# Patient Record
Sex: FEMALE | Race: BLACK OR AFRICAN AMERICAN | Employment: UNEMPLOYED | URBAN - METROPOLITAN AREA
[De-identification: names, ages, dates, MRNs, and addresses within clinical notes are randomized per-mention and may not be internally consistent; named-entity substitution may affect disease eponyms.]

---

## 2019-09-25 LAB
CHLAMYDIA, EXTERNAL: NORMAL
GTT, 1 HR, GLUCOLA, EXTERNAL: 99
HBSAG, EXTERNAL: NORMAL
HIV, EXTERNAL: NORMAL
RPR, EXTERNAL: NORMAL
RUBELLA, EXTERNAL: NORMAL
TYPE, ABO & RH, EXTERNAL: NORMAL

## 2019-09-27 PROBLEM — O99.212 OBESITY AFFECTING PREGNANCY IN SECOND TRIMESTER: Status: ACTIVE | Noted: 2019-09-27

## 2019-10-15 PROBLEM — O09.92 HIGH-RISK PREGNANCY IN SECOND TRIMESTER: Status: ACTIVE | Noted: 2019-10-15

## 2019-10-21 PROBLEM — O23.42 URINARY TRACT INFECTION IN MOTHER DURING SECOND TRIMESTER OF PREGNANCY: Status: ACTIVE | Noted: 2019-10-21

## 2019-12-09 PROBLEM — O99.013 ANEMIA IN PREGNANCY, THIRD TRIMESTER: Status: ACTIVE | Noted: 2019-12-09

## 2020-02-09 PROBLEM — O99.820 GBS (GROUP B STREPTOCOCCUS CARRIER), +RV CULTURE, CURRENTLY PREGNANT: Status: ACTIVE | Noted: 2020-02-09

## 2020-02-20 ENCOUNTER — HOSPITAL ENCOUNTER (OUTPATIENT)
Age: 25
Discharge: HOME | End: 2020-02-20
Attending: OBSTETRICS & GYNECOLOGY | Admitting: OBSTETRICS & GYNECOLOGY
Payer: MEDICAID

## 2020-02-20 VITALS
HEART RATE: 111 BPM | DIASTOLIC BLOOD PRESSURE: 57 MMHG | SYSTOLIC BLOOD PRESSURE: 106 MMHG | TEMPERATURE: 98.1 F | RESPIRATION RATE: 18 BRPM

## 2020-02-20 PROCEDURE — 99281 EMR DPT VST MAYX REQ PHY/QHP: CPT

## 2020-02-20 PROCEDURE — 59025 FETAL NON-STRESS TEST: CPT

## 2020-02-20 NOTE — DISCHARGE INSTRUCTIONS
Patient Education   Patient Education   Patient Education        Week 39 of Your Pregnancy: Care Instructions  Your Care Instructions    During these final weeks, you may feel anxious to see your new baby.  babies often look different from what you see in pictures or movies. Right after birth, their heads may have a strange shape. Their eyes may be puffy. And their genitals may be swollen. They may also have very dry skin, or red marks on the eyelids, nose, or neck. Still, most parents think their babies are beautiful. Follow-up care is a key part of your treatment and safety. Be sure to make and go to all appointments, and call your doctor if you are having problems. It's also a good idea to know your test results and keep a list of the medicines you take. How can you care for yourself at home? Prepare to breastfeed  · If you are breastfeeding, continue to eat healthy foods. · If your health care provider recommends it, keep taking your prenatal vitamins. · Talk to your doctor before you take any medicine or supplement. That's because some medicines can affect your breast milk. · You can help prevent sore nipples if you feed your baby in the correct position. Nurses will help you learn to do this. · Your  will need to be fed about every 1 to 3 hours. Choose the right birth control after your baby is born  · Women who are breastfeeding can still get pregnant. Use birth control if you don't want to get pregnant. · Intrauterine devices (IUDs) are very effective at preventing pregnancy and can provide birth control for 3 to 10 years, depending on the type. If you talk with your doctor before having your baby, the IUD can be placed right after you give birth. If you decide you want to get pregnant later, you can have it removed. They are safe to use while you are breastfeeding. · A hormonal implant is also very effective at preventing pregnancy. It is placed under the skin of the arm.  This can be done right after you give birth. It releases the hormone progestin and prevents pregnancy for about 3 years. This can also be removed by a doctor at any time. It is safe to use while you are breastfeeding. · Depo-Provera can be used while you are breastfeeding. It is a shot you get every 3 months. · Birth control pills work well. But you need a different kind of pill for the first few weeks after giving birth. And when you start taking these pills, you need to make sure to use another type of birth control for 7 days after you start your pack. · Diaphragms, cervical caps, and condoms with spermicide work less well after birth. If you have a diaphragm or cervical cap, talk to your doctor to see if you need a different size. · Tubal ligation (tying your tubes) and vasectomy are both permanent. These are good options if you are sure you are done having children. Where can you learn more? Go to http://ramila-sharmaine.info/. Enter G434 in the search box to learn more about \"Week 39 of Your Pregnancy: Care Instructions. \"  Current as of: May 29, 2019  Content Version: 12.2  © 8212-9446 [employer]. Care instructions adapted under license by [employer] (which disclaims liability or warranty for this information). If you have questions about a medical condition or this instruction, always ask your healthcare professional. [address] any warranty or liability for your use of this information. Pregnancy Precautions: Care Instructions  Your Care Instructions    There is no sure way to prevent labor before your due date ( labor) or to prevent most other pregnancy problems. But there are things you can do to increase your chances of a healthy pregnancy. Go to your appointments, follow your doctor's advice, and take good care of yourself. Eat well, and exercise (if your doctor agrees). And make sure to drink plenty of water.   Follow-up care is a key part of your treatment and safety. Be sure to make and go to all appointments, and call your doctor if you are having problems. It's also a good idea to know your test results and keep a list of the medicines you take. How can you care for yourself at home? · Make sure you go to your prenatal appointments. At each visit, your doctor will check your blood pressure. Your doctor will also check to see if you have protein in your urine. High blood pressure and protein in urine are signs of preeclampsia. This condition can be dangerous for you and your baby. · Drink plenty of fluids, enough so that your urine is light yellow or clear like water. Dehydration can cause contractions. If you have kidney, heart, or liver disease and have to limit fluids, talk with your doctor before you increase the amount of fluids you drink. · Tell your doctor right away if you notice any symptoms of an infection, such as:  ? Burning when you urinate. ? A foul-smelling discharge from your vagina. ? Vaginal itching. ? Unexplained fever. ? Unusual pain or soreness in your uterus or lower belly. · Eat a balanced diet. Include plenty of foods that are high in calcium and iron. ? Foods high in calcium include milk, cheese, yogurt, almonds, and broccoli. ? Foods high in iron include red meat, shellfish, poultry, eggs, beans, raisins, whole-grain bread, and leafy green vegetables. · Do not smoke. If you need help quitting, talk to your doctor about stop-smoking programs and medicines. These can increase your chances of quitting for good. · Do not drink alcohol or use illegal drugs. · Follow your doctor's directions about activity. Your doctor will let you know how much, if any, exercise you can do. · Ask your doctor if you can have sex. If you are at risk for early labor, your doctor may ask you to not have sex. · Take care to prevent falls. During pregnancy, your joints are loose, and your balance is off.  Sports such as bicycling, skiing, or in-line skating can increase your risk of falling. And don't ride horses or motorcycles, dive, water ski, scuba dive, or parachute jump while you are pregnant. · Avoid getting very hot. Do not use saunas or hot tubs. Avoid staying out in the sun in hot weather for long periods. Take acetaminophen (Tylenol) to lower a high fever. · Do not take any over-the-counter or herbal medicines or supplements without talking to your doctor or pharmacist first.  When should you call for help? Call 911 anytime you think you may need emergency care. For example, call if:    · You passed out (lost consciousness).     · You have a seizure.     · You have severe vaginal bleeding.     · You have severe pain in your belly or pelvis.     · You have had fluid gushing or leaking from your vagina and you know or think the umbilical cord is bulging into your vagina. If this happens, immediately get down on your knees so your rear end (buttocks) is higher than your head. This will decrease the pressure on the cord until help arrives.   [facility] your doctor now or seek immediate medical care if:    · You have signs of preeclampsia, such as:  ? Sudden swelling of your face, hands, or feet. ? New vision problems (such as dimness, blurring, or seeing spots). ? A severe headache.     · You have any vaginal bleeding.     · You have belly pain or cramping.     · You have a fever.     · You have had regular contractions (with or without pain) for an hour. This means that you have 8 or more within 1 hour or 4 or more in 20 minutes after you change your position and drink fluids.     · You have a sudden release of fluid from your vagina.     · You have low back pain or pelvic pressure that does not go away.     · You notice that your baby has stopped moving or is moving much less than normal.    Watch closely for changes in your health, and be sure to contact your doctor if you have any problems. Where can you learn more?   Go to http://ramila-sharmaine.info/. Enter [id number] in the search box to learn more about \"Pregnancy Precautions: Care Instructions. \"  Current as of: May 29, 2019  Content Version: 12.2  © 5161-9033 [employer]. Care instructions adapted under license by [employer] (which disclaims liability or warranty for this information). If you have questions about a medical condition or this instruction, always ask your healthcare professional. [address] any warranty or liability for your use of this information. Learning About When to Call Your Doctor During Pregnancy (After 20 Weeks)  Your Care Instructions  It's common to have concerns about what might be a problem during pregnancy. Although most pregnant women don't have any serious problems, it's important to know when to call your doctor if you have certain symptoms or signs of labor. These are general suggestions. Your doctor may give you some more information about when to call. When to call your doctor (after 20 weeks)  Call 911 anytime you think you may need emergency care. For example, call if:  · You have severe vaginal bleeding. · You have sudden, severe pain in your belly. · You passed out (lost consciousness). · You have a seizure. · You see or feel the umbilical cord. · You think you are about to deliver your baby and can't make it safely to the hospital.  Call your doctor now or seek immediate medical care if:  · You have vaginal bleeding. · You have belly pain. · You have a fever. · You have symptoms of preeclampsia, such as:  ? Sudden swelling of your face, hands, or feet. ? New vision problems (such as dimness, blurring, or seeing spots). ? A severe headache. · You have a sudden release of fluid from your vagina. (You think your water broke.)  · You think that you may be in labor. This means that you've had at least 6 contractions in an hour.   · You notice that your baby has stopped moving or is moving much less than normal.  · You have symptoms of a urinary tract infection. These may include:  ? Pain or burning when you urinate. ? A frequent need to urinate without being able to pass much urine. ? Pain in the flank, which is just below the rib cage and above the waist on either side of the back. ? Blood in your urine. Watch closely for changes in your health, and be sure to contact your doctor if:  · You have vaginal discharge that smells bad. · You have skin changes, such as:  ? A rash. ? Itching. ? Yellow color to your skin. · You have other concerns about your pregnancy. If you have labor signs at 37 weeks or more  If you have signs of labor at 37 weeks or more, your doctor may tell you to call when your labor becomes more active. Symptoms of active labor include:  · Contractions that are regular. · Contractions that are less than 5 minutes apart. · Contractions that are hard to talk through. Follow-up care is a key part of your treatment and safety. Be sure to make and go to all appointments, and call your doctor if you are having problems. It's also a good idea to know your test results and keep a list of the medicines you take. Where can you learn more? Go to http://ramila-sharmaine.info/. Enter  in the search box to learn more about \"Learning About When to Call Your Doctor During Pregnancy (After 20 Weeks). \"  Current as of: May 29, 2019  Content Version: 12.2  © 0142-8458 [employer]. Care instructions adapted under license by [employer] (which disclaims liability or warranty for this information). If you have questions about a medical condition or this instruction, always ask your healthcare professional. [place] [address] any warranty or liability for your use of this information.

## 2020-02-20 NOTE — PROGRESS NOTES
1640  at 39.0 weeks ambulated onto the unit from the office for a non-reassuring NST. Taken to triage 2 and oriented to area. Patient using restroom and changing into gown at this time.  EFM and TOCO applied.  Reviewed reactive NST with DUKE Garcia CNM. Received discharge orders. EFM and TOCO discontinued.  Pt discharged home. Verbal and written discharge instructions given including LOF, vaginal bleeding, kick counts, s/s of active labor, importance of drinking plenty of fluids, eating regular meals, and keeping scheduled appointments. Pt verbalizes understanding. Pt ambulated off unit in stable condition.

## 2020-02-26 ENCOUNTER — HOSPITAL ENCOUNTER (OUTPATIENT)
Age: 25
Discharge: HOME | End: 2020-02-26
Attending: OBSTETRICS & GYNECOLOGY | Admitting: OBSTETRICS & GYNECOLOGY
Payer: MEDICAID

## 2020-02-26 VITALS
HEART RATE: 96 BPM | DIASTOLIC BLOOD PRESSURE: 64 MMHG | SYSTOLIC BLOOD PRESSURE: 116 MMHG | RESPIRATION RATE: 17 BRPM | TEMPERATURE: 98.3 F | OXYGEN SATURATION: 99 %

## 2020-02-26 PROBLEM — O47.9 IRREGULAR CONTRACTIONS: Status: ACTIVE | Noted: 2020-02-26

## 2020-02-26 PROBLEM — Z33.1 IUP (INTRAUTERINE PREGNANCY), INCIDENTAL: Status: ACTIVE | Noted: 2020-02-26

## 2020-02-26 PROBLEM — Z3A.39 39 WEEKS GESTATION OF PREGNANCY: Status: ACTIVE | Noted: 2020-02-26

## 2020-02-26 PROBLEM — R10.9 ABDOMINAL CRAMPING AFFECTING PREGNANCY: Status: ACTIVE | Noted: 2020-02-26

## 2020-02-26 PROBLEM — O26.899 ABDOMINAL CRAMPING AFFECTING PREGNANCY: Status: ACTIVE | Noted: 2020-02-26

## 2020-02-26 LAB
APPEARANCE UR: ABNORMAL
BILIRUB UR QL: NEGATIVE
COLOR UR: ABNORMAL
GLUCOSE UR QL STRIP.AUTO: NEGATIVE MG/DL
KETONES UR-MCNC: >160 MG/DL
LEUKOCYTE ESTERASE UR QL STRIP: ABNORMAL
NITRITE UR QL: NEGATIVE
PH UR: 5.5 [PH] (ref 5–9)
PROT UR QL: ABNORMAL MG/DL
RBC # UR STRIP: NEGATIVE /UL
SERVICE CMNT-IMP: ABNORMAL
SP GR UR: 1.02 (ref 1–1.02)
UROBILINOGEN UR QL: 0.2 EU/DL (ref 0.2–1)

## 2020-02-26 PROCEDURE — 81003 URINALYSIS AUTO W/O SCOPE: CPT

## 2020-02-26 PROCEDURE — 59025 FETAL NON-STRESS TEST: CPT

## 2020-02-26 PROCEDURE — 96360 HYDRATION IV INFUSION INIT: CPT

## 2020-02-26 PROCEDURE — 96361 HYDRATE IV INFUSION ADD-ON: CPT

## 2020-02-26 PROCEDURE — 74011250636 HC RX REV CODE- 250/636: Performed by: OBSTETRICS & GYNECOLOGY

## 2020-02-26 PROCEDURE — 99285 EMERGENCY DEPT VISIT HI MDM: CPT

## 2020-02-26 RX ORDER — SODIUM CHLORIDE, SODIUM LACTATE, POTASSIUM CHLORIDE, CALCIUM CHLORIDE 600; 310; 30; 20 MG/100ML; MG/100ML; MG/100ML; MG/100ML
150 INJECTION, SOLUTION INTRAVENOUS CONTINUOUS
Status: DISCONTINUED | OUTPATIENT
Start: 2020-02-26 | End: 2020-02-27 | Stop reason: HOSPADM

## 2020-02-26 RX ADMIN — SODIUM CHLORIDE, SODIUM LACTATE, POTASSIUM CHLORIDE, AND CALCIUM CHLORIDE 150 ML/HR: 600; 310; 30; 20 INJECTION, SOLUTION INTRAVENOUS at 18:15

## 2020-02-26 RX ADMIN — SODIUM CHLORIDE, SODIUM LACTATE, POTASSIUM CHLORIDE, AND CALCIUM CHLORIDE 1000 ML: 600; 310; 30; 20 INJECTION, SOLUTION INTRAVENOUS at 17:33

## 2020-02-26 NOTE — PROGRESS NOTES
Received report from [address] [place] [address], Assuming care of pt at this time. Pt left side lying . Urine showing pt is dehydrated.  Pt given a tall cup of iced water to drink

## 2020-02-26 NOTE — H&P
Ostetrical History and Physical    Subjective:     Date of Admission: 2020    Patient is a 25 y.o.   female admitted with term preg, G1, with irreg contractions. .    For Obstetric history, see eriberto.    For Review of Systems, see prenatal    Past Medical History:   Diagnosis Date    Anemia     Asthma     last attack years ago    Recurrent UTI       History reviewed. No pertinent surgical history. Prior to Admission medications    Medication Sig Start Date End Date Taking? Authorizing Provider   terconazole (TERAZOL 7) 0.4 % vaginal cream Insert 1 Applicator into vagina nightly. 20   Leah Reese NP   loratadine (CLARITIN) 10 mg tablet Take 1 Tab by mouth daily. 20   Ellis, Rachel Arce NP   ondansetron (ZOFRAN ODT) 8 mg disintegrating tablet Take 1 Tab by mouth every eight (8) hours as needed for Nausea. 20   Ellis, Rachel Arce NP   ferrous sulfate (IRON) 325 mg (65 mg iron) EC tablet Take 1 Tab by mouth daily. 19   Corral, Rachel Arce NP   prenatal vit-calcium-iron-fa (PRENATAL PLUS, CALCIUM CARB,) 27 mg iron- 1 mg tab Take 1 Tab by mouth daily. Provider, Historical   multivitamin (ONE A DAY) tablet Take 1 Tab by mouth daily. Provider, Historical     No Known Allergies   Social History     Tobacco Use    Smoking status: Never Smoker    Smokeless tobacco: Never Used   Substance Use Topics    Alcohol use: Not Currently      Family History   Problem Relation Age of Onset    Stroke Maternal Grandfather           Objective:     Blood pressure 102/84, pulse (!) 111, temperature 98.3 °F (36.8 °C), resp. rate 17, SpO2 99 %. Temp (24hrs), Av.3 °F (36.8 °C), Min:98.3 °F (36.8 °C), Max:98.3 °F (36.8 °C)         07 -  1900  In: 500 [P.O.:500]  Out: -   No intake/output data recorded. HEENT: No pallor, no jaundice, Thyroid and throat normal  RESPIRATORY: Clear to A & P  CVS: pulse reg, HS normal  ABDOMEN: Gravid. Vertex. . No abnormal tenderness.    Pelvic: Cervix 1.5,   Effaced: 80%  Station:  -2  Data Review:   Recent Results (from the past 24 hour(s))   POC URINE MACROSCOPIC    Collection Time: 02/26/20  2:49 PM   Result Value Ref Range    Color DARK YELLOW      Appearance SLIGHTLY CLOUDY      Spec. gravity (POC) 1.025 (H) 1.001 - 1.023      pH, urine  (POC) 5.5 5.0 - 9.0      Protein (POC) TRACE (A) NEG mg/dL    Glucose, urine (POC) NEGATIVE  NEG mg/dL    Ketones (POC) >160 (A) NEG mg/dL    Bilirubin (POC) NEGATIVE  NEG      Blood (POC) NEGATIVE  NEG      Urobilinogen (POC) 0.2 0.2 - 1.0 EU/dL    Nitrite (POC) NEGATIVE  NEG      Leukocyte esterase (POC) TRACE (A) NEG      Performed by Katiuska Dam      Monitor:  Reactivity:present Variability:present Baseline:within normal limits    Assessment:     Principal Problem:    Irregular contractions (2/26/2020)    Active Problems:    IUP (intrauterine pregnancy), incidental (2/26/2020)      39 weeks gestation of pregnancy (2/26/2020)      Abdominal cramping affecting pregnancy (2/26/2020)    tachycardia    Plan:   Monitor, reassessed after 1.5hrs and no change. Note tachycardia, dry, hydrate    Check labs:  Urinalysis  Check  Prenatal:    Disposition: Home when tachycardia resolved    Type of admit:Out Paitent    I saw and examined patient.     Signed By: Vielka Cartagena MD                         February 26, 2020

## 2020-02-27 ENCOUNTER — HOSPITAL ENCOUNTER (OUTPATIENT)
Age: 25
Discharge: HOME | DRG: 560 | End: 2020-02-27
Attending: OBSTETRICS & GYNECOLOGY | Admitting: OBSTETRICS & GYNECOLOGY
Payer: MEDICAID

## 2020-02-27 VITALS
HEIGHT: 66 IN | WEIGHT: 234 LBS | SYSTOLIC BLOOD PRESSURE: 118 MMHG | DIASTOLIC BLOOD PRESSURE: 68 MMHG | BODY MASS INDEX: 37.61 KG/M2 | HEART RATE: 97 BPM

## 2020-02-27 LAB
APPEARANCE UR: ABNORMAL
BILIRUB UR QL: NEGATIVE
COLOR UR: YELLOW
GLUCOSE UR QL STRIP.AUTO: 100 MG/DL
KETONES UR-MCNC: 40 MG/DL
LEUKOCYTE ESTERASE UR QL STRIP: ABNORMAL
NITRITE UR QL: NEGATIVE
PH UR: 6 [PH] (ref 5–9)
PROT UR QL: NEGATIVE MG/DL
RBC # UR STRIP: NEGATIVE /UL
SERVICE CMNT-IMP: ABNORMAL
SP GR UR: 1.02 (ref 1–1.02)
UROBILINOGEN UR QL: 0.2 EU/DL (ref 0.2–1)

## 2020-02-27 PROCEDURE — 59025 FETAL NON-STRESS TEST: CPT

## 2020-02-27 PROCEDURE — 81003 URINALYSIS AUTO W/O SCOPE: CPT

## 2020-02-27 NOTE — PROGRESS NOTES
Dr Cedrick Ruby was called- he was informed pt has received the 2 liters of IV LR that physician ordered due to pt's tachycardia. Dr Cedrick Ruby has been informed pt's pulse rate is now 93 to 106. Order for discharge received.

## 2020-02-27 NOTE — PROGRESS NOTES
Pt was here for c/o cramping. Pt is not in labor. She has been po hydrated, and also IV hydrated with 2 liters IV LR due to tachycardia. Her pulse rate is now . Pt has been provided with discharge instructions\"Pregnancy Precautions\", which have been reviewed with her. She has NST at Dr Renata Gr office [address] [place] [address], and has office appt also on Monday with Dr Shadia Munson. Pt verbalizes understanding of discharge instructions, and has no further questions. Discharged home, ambulatory.

## 2020-02-27 NOTE — PROGRESS NOTES
presents at 40.0 weeks gestation due to missing her NST appointment and c/o some mild cramping that happens, \"every once in awhile. \" Pt reports that she that she has only had one glass of water all day. EFM applied. Hx confirmed. Pt oriented to room.

## 2020-02-27 NOTE — ROUTINE PROCESS
1730: Dr. Nury Oliveros notified of SVE, patient's complaints, NST, vital signs with tachycardia reported, Dr. Nury Oliveros reviewed heart rates. Order received for IV hydration and discharge when heart rate decreases after IV fluid boluses as dehydration noted on patient's POC urine sample.

## 2020-02-28 NOTE — PROGRESS NOTES
Dr. Jefry Lindsay on the phone and updated regarding reactive FHR tracing. Telephone orders received at this time to discharge pt home to follow up as scheduled. No further orders received at this time.

## 2020-02-29 ENCOUNTER — HOSPITAL ENCOUNTER (INPATIENT)
Age: 25
LOS: 2 days | Discharge: HOME | DRG: 560 | End: 2020-03-03
Attending: OBSTETRICS & GYNECOLOGY | Admitting: OBSTETRICS & GYNECOLOGY
Payer: MEDICAID

## 2020-02-29 DIAGNOSIS — O99.013 ANEMIA IN PREGNANCY, THIRD TRIMESTER: ICD-10-CM

## 2020-02-29 LAB
ABO + RH BLD: NORMAL
BASOPHILS # BLD: 0 K/UL (ref 0–0.1)
BASOPHILS NFR BLD: 0 % (ref 0–2)
BLOOD GROUP ANTIBODIES SERPL: NORMAL
DIFFERENTIAL METHOD BLD: ABNORMAL
EOSINOPHIL # BLD: 0.1 K/UL (ref 0–0.4)
EOSINOPHIL NFR BLD: 1 % (ref 0–5)
ERYTHROCYTE [DISTWIDTH] IN BLOOD BY AUTOMATED COUNT: 15.3 % (ref 11.6–14.5)
HCT VFR BLD AUTO: 38 % (ref 35–45)
HCT, EXTERNAL: 38
HGB BLD-MCNC: 11.9 G/DL (ref 12–16)
HGB, EXTERNAL: 11.9
LYMPHOCYTES # BLD: 1.8 K/UL (ref 0.9–3.6)
LYMPHOCYTES NFR BLD: 19 % (ref 21–52)
MCH RBC QN AUTO: 26.9 PG (ref 24–34)
MCHC RBC AUTO-ENTMCNC: 31.3 G/DL (ref 31–37)
MCV RBC AUTO: 85.8 FL (ref 74–97)
MONOCYTES # BLD: 1.1 K/UL (ref 0.05–1.2)
MONOCYTES NFR BLD: 11 % (ref 3–10)
NEUTS SEG # BLD: 6.6 K/UL (ref 1.8–8)
NEUTS SEG NFR BLD: 69 % (ref 40–73)
PLATELET # BLD AUTO: 159 K/UL (ref 135–420)
PLATELET CNT,   EXTERNAL: 159
PMV BLD AUTO: 12.3 FL (ref 9.2–11.8)
RBC # BLD AUTO: 4.43 M/UL (ref 4.2–5.3)
SPECIMEN EXP DATE BLD: NORMAL
WBC # BLD AUTO: 9.5 K/UL (ref 4.6–13.2)

## 2020-02-29 PROCEDURE — 75410000002 HC LABOR FEE PER 1 HR

## 2020-02-29 PROCEDURE — 74011000258 HC RX REV CODE- 258: Performed by: OBSTETRICS & GYNECOLOGY

## 2020-02-29 PROCEDURE — 85025 COMPLETE CBC W/AUTO DIFF WBC: CPT

## 2020-02-29 PROCEDURE — 74011250636 HC RX REV CODE- 250/636: Performed by: OBSTETRICS & GYNECOLOGY

## 2020-02-29 PROCEDURE — 86900 BLOOD TYPING SEROLOGIC ABO: CPT

## 2020-02-29 PROCEDURE — 65270000029 HC RM PRIVATE

## 2020-02-29 RX ORDER — METHYLERGONOVINE MALEATE 0.2 MG/ML
0.2 INJECTION INTRAVENOUS AS NEEDED
Status: DISCONTINUED | OUTPATIENT
Start: 2020-02-29 | End: 2020-03-01 | Stop reason: HOSPADM

## 2020-02-29 RX ORDER — LIDOCAINE HYDROCHLORIDE 10 MG/ML
20 INJECTION, SOLUTION EPIDURAL; INFILTRATION; INTRACAUDAL; PERINEURAL AS NEEDED
Status: DISCONTINUED | OUTPATIENT
Start: 2020-02-29 | End: 2020-03-01 | Stop reason: HOSPADM

## 2020-02-29 RX ORDER — SODIUM CHLORIDE, SODIUM LACTATE, POTASSIUM CHLORIDE, CALCIUM CHLORIDE 600; 310; 30; 20 MG/100ML; MG/100ML; MG/100ML; MG/100ML
125 INJECTION, SOLUTION INTRAVENOUS CONTINUOUS
Status: DISCONTINUED | OUTPATIENT
Start: 2020-02-29 | End: 2020-03-01 | Stop reason: HOSPADM

## 2020-02-29 RX ORDER — OXYTOCIN/0.9 % SODIUM CHLORIDE 20/1000 ML
125 PLASTIC BAG, INJECTION (ML) INTRAVENOUS CONTINUOUS
Status: DISCONTINUED | OUTPATIENT
Start: 2020-02-29 | End: 2020-03-01 | Stop reason: HOSPADM

## 2020-02-29 RX ORDER — NALBUPHINE HYDROCHLORIDE 10 MG/ML
10 INJECTION, SOLUTION INTRAMUSCULAR; INTRAVENOUS; SUBCUTANEOUS
Status: DISCONTINUED | OUTPATIENT
Start: 2020-02-29 | End: 2020-03-01 | Stop reason: HOSPADM

## 2020-02-29 RX ORDER — TERBUTALINE SULFATE 1 MG/ML
0.25 INJECTION SUBCUTANEOUS
Status: DISCONTINUED | OUTPATIENT
Start: 2020-02-29 | End: 2020-03-01 | Stop reason: HOSPADM

## 2020-02-29 RX ORDER — HYDROMORPHONE HYDROCHLORIDE 1 MG/ML
1 INJECTION, SOLUTION INTRAMUSCULAR; INTRAVENOUS; SUBCUTANEOUS
Status: DISCONTINUED | OUTPATIENT
Start: 2020-02-29 | End: 2020-03-01 | Stop reason: HOSPADM

## 2020-02-29 RX ORDER — BUTORPHANOL TARTRATE 2 MG/ML
2 INJECTION INTRAMUSCULAR; INTRAVENOUS
Status: DISCONTINUED | OUTPATIENT
Start: 2020-02-29 | End: 2020-03-01 | Stop reason: HOSPADM

## 2020-02-29 RX ORDER — OXYTOCIN/0.9 % SODIUM CHLORIDE 20/1000 ML
999 PLASTIC BAG, INJECTION (ML) INTRAVENOUS ONCE
Status: DISPENSED | OUTPATIENT
Start: 2020-02-29 | End: 2020-03-01

## 2020-02-29 RX ORDER — MINERAL OIL
30 OIL (ML) ORAL AS NEEDED
Status: DISCONTINUED | OUTPATIENT
Start: 2020-02-29 | End: 2020-03-01

## 2020-02-29 RX ADMIN — SODIUM CHLORIDE, SODIUM LACTATE, POTASSIUM CHLORIDE, AND CALCIUM CHLORIDE 125 ML/HR: 600; 310; 30; 20 INJECTION, SOLUTION INTRAVENOUS at 21:42

## 2020-02-29 RX ADMIN — SODIUM CHLORIDE 5 MILLION UNITS: 900 INJECTION INTRAVENOUS at 21:42

## 2020-03-01 ENCOUNTER — ANESTHESIA (OUTPATIENT)
Dept: LABOR AND DELIVERY | Age: 25
DRG: 560 | End: 2020-03-01
Payer: MEDICAID

## 2020-03-01 ENCOUNTER — ANESTHESIA EVENT (OUTPATIENT)
Dept: LABOR AND DELIVERY | Age: 25
DRG: 560 | End: 2020-03-01
Payer: MEDICAID

## 2020-03-01 PROBLEM — O23.42 URINARY TRACT INFECTION IN MOTHER DURING SECOND TRIMESTER OF PREGNANCY: Status: RESOLVED | Noted: 2019-10-21 | Resolved: 2020-03-01

## 2020-03-01 PROBLEM — Z3A.39 39 WEEKS GESTATION OF PREGNANCY: Status: RESOLVED | Noted: 2020-02-26 | Resolved: 2020-03-01

## 2020-03-01 PROBLEM — O99.213 OBESITY AFFECTING PREGNANCY IN THIRD TRIMESTER: Status: ACTIVE | Noted: 2019-09-27

## 2020-03-01 PROBLEM — O47.9 UTERINE CONTRACTIONS DURING PREGNANCY: Status: ACTIVE | Noted: 2020-03-01

## 2020-03-01 PROBLEM — O47.9 IRREGULAR CONTRACTIONS: Status: RESOLVED | Noted: 2020-02-26 | Resolved: 2020-03-01

## 2020-03-01 PROBLEM — Z3A.40 40 WEEKS GESTATION OF PREGNANCY: Status: ACTIVE | Noted: 2020-03-01

## 2020-03-01 PROBLEM — O09.93 HIGH-RISK PREGNANCY IN THIRD TRIMESTER: Status: ACTIVE | Noted: 2019-10-15

## 2020-03-01 PROBLEM — R10.9 ABDOMINAL CRAMPING AFFECTING PREGNANCY: Status: RESOLVED | Noted: 2020-02-26 | Resolved: 2020-03-01

## 2020-03-01 PROBLEM — O26.899 ABDOMINAL CRAMPING AFFECTING PREGNANCY: Status: RESOLVED | Noted: 2020-02-26 | Resolved: 2020-03-01

## 2020-03-01 PROCEDURE — 74011250636 HC RX REV CODE- 250/636: Performed by: ANESTHESIOLOGY

## 2020-03-01 PROCEDURE — 0KQM0ZZ REPAIR PERINEUM MUSCLE, OPEN APPROACH: ICD-10-PCS | Performed by: OBSTETRICS & GYNECOLOGY

## 2020-03-01 PROCEDURE — 74011250636 HC RX REV CODE- 250/636: Performed by: OBSTETRICS & GYNECOLOGY

## 2020-03-01 PROCEDURE — 75410000002 HC LABOR FEE PER 1 HR

## 2020-03-01 PROCEDURE — 76060000078 HC EPIDURAL ANESTHESIA

## 2020-03-01 PROCEDURE — 74011000250 HC RX REV CODE- 250: Performed by: ANESTHESIOLOGY

## 2020-03-01 PROCEDURE — 59025 FETAL NON-STRESS TEST: CPT

## 2020-03-01 PROCEDURE — 4A1HXCZ MONITORING OF PRODUCTS OF CONCEPTION, CARDIAC RATE, EXTERNAL APPROACH: ICD-10-PCS | Performed by: OBSTETRICS & GYNECOLOGY

## 2020-03-01 PROCEDURE — 00HU33Z INSERTION OF INFUSION DEVICE INTO SPINAL CANAL, PERCUTANEOUS APPROACH: ICD-10-PCS | Performed by: ANESTHESIOLOGY

## 2020-03-01 PROCEDURE — 77030007879 HC KT SPN EPDRL TELE -B: Performed by: ANESTHESIOLOGY

## 2020-03-01 PROCEDURE — 74011250637 HC RX REV CODE- 250/637: Performed by: OBSTETRICS & GYNECOLOGY

## 2020-03-01 PROCEDURE — 75410000003 HC RECOV DEL/VAG/CSECN EA 0.5 HR

## 2020-03-01 PROCEDURE — 99282 EMERGENCY DEPT VISIT SF MDM: CPT

## 2020-03-01 PROCEDURE — 75410000000 HC DELIVERY VAGINAL/SINGLE

## 2020-03-01 PROCEDURE — 65270000029 HC RM PRIVATE

## 2020-03-01 RX ORDER — ZOLPIDEM TARTRATE 5 MG/1
5 TABLET ORAL
Status: DISCONTINUED | OUTPATIENT
Start: 2020-03-01 | End: 2020-03-03 | Stop reason: HOSPADM

## 2020-03-01 RX ORDER — FENTANYL CITRATE 50 UG/ML
100 INJECTION, SOLUTION INTRAMUSCULAR; INTRAVENOUS ONCE
Status: DISCONTINUED | OUTPATIENT
Start: 2020-03-01 | End: 2020-03-01 | Stop reason: HOSPADM

## 2020-03-01 RX ORDER — MINERAL OIL
30 OIL (ML) ORAL AS NEEDED
Status: DISCONTINUED | OUTPATIENT
Start: 2020-03-01 | End: 2020-03-01 | Stop reason: HOSPADM

## 2020-03-01 RX ORDER — NALOXONE HYDROCHLORIDE 0.4 MG/ML
0.2 INJECTION, SOLUTION INTRAMUSCULAR; INTRAVENOUS; SUBCUTANEOUS AS NEEDED
Status: DISCONTINUED | OUTPATIENT
Start: 2020-03-01 | End: 2020-03-01 | Stop reason: HOSPADM

## 2020-03-01 RX ORDER — SODIUM CHLORIDE 0.9 % (FLUSH) 0.9 %
5-40 SYRINGE (ML) INJECTION AS NEEDED
Status: DISCONTINUED | OUTPATIENT
Start: 2020-03-01 | End: 2020-03-01 | Stop reason: HOSPADM

## 2020-03-01 RX ORDER — IBUPROFEN 400 MG/1
800 TABLET ORAL
Status: DISCONTINUED | OUTPATIENT
Start: 2020-03-01 | End: 2020-03-03 | Stop reason: HOSPADM

## 2020-03-01 RX ORDER — OXYCODONE AND ACETAMINOPHEN 5; 325 MG/1; MG/1
2 TABLET ORAL
Status: DISCONTINUED | OUTPATIENT
Start: 2020-03-01 | End: 2020-03-03 | Stop reason: HOSPADM

## 2020-03-01 RX ORDER — FENTANYL CITRATE 50 UG/ML
INJECTION, SOLUTION INTRAMUSCULAR; INTRAVENOUS AS NEEDED
Status: DISCONTINUED | OUTPATIENT
Start: 2020-03-01 | End: 2020-03-01 | Stop reason: HOSPADM

## 2020-03-01 RX ORDER — AMOXICILLIN 250 MG
1 CAPSULE ORAL
Status: DISCONTINUED | OUTPATIENT
Start: 2020-03-01 | End: 2020-03-03 | Stop reason: HOSPADM

## 2020-03-01 RX ORDER — LIDOCAINE HYDROCHLORIDE 10 MG/ML
INJECTION INFILTRATION; PERINEURAL AS NEEDED
Status: DISCONTINUED | OUTPATIENT
Start: 2020-03-01 | End: 2020-03-01 | Stop reason: HOSPADM

## 2020-03-01 RX ORDER — OXYTOCIN/0.9 % SODIUM CHLORIDE 30/500 ML
0-20 PLASTIC BAG, INJECTION (ML) INTRAVENOUS
Status: DISCONTINUED | OUTPATIENT
Start: 2020-03-01 | End: 2020-03-03 | Stop reason: HOSPADM

## 2020-03-01 RX ORDER — LIDOCAINE HYDROCHLORIDE AND EPINEPHRINE 15; 5 MG/ML; UG/ML
INJECTION, SOLUTION EPIDURAL
Status: COMPLETED | OUTPATIENT
Start: 2020-03-01 | End: 2020-03-01

## 2020-03-01 RX ORDER — MINERAL OIL
OIL (ML) ORAL
Status: DISPENSED
Start: 2020-03-01 | End: 2020-03-01

## 2020-03-01 RX ORDER — ACETAMINOPHEN 325 MG/1
650 TABLET ORAL
Status: DISCONTINUED | OUTPATIENT
Start: 2020-03-01 | End: 2020-03-03 | Stop reason: HOSPADM

## 2020-03-01 RX ORDER — PHENYLEPHRINE HCL IN 0.9% NACL 1 MG/10 ML
80 SYRINGE (ML) INTRAVENOUS AS NEEDED
Status: DISCONTINUED | OUTPATIENT
Start: 2020-03-01 | End: 2020-03-01 | Stop reason: HOSPADM

## 2020-03-01 RX ORDER — FENTANYL/ROPIVACAINE/NS/PF 2MCG/ML-.1
1-15 PLASTIC BAG, INJECTION (ML) EPIDURAL CONTINUOUS
Status: DISCONTINUED | OUTPATIENT
Start: 2020-03-01 | End: 2020-03-01 | Stop reason: HOSPADM

## 2020-03-01 RX ORDER — PROMETHAZINE HYDROCHLORIDE 25 MG/ML
25 INJECTION, SOLUTION INTRAMUSCULAR; INTRAVENOUS
Status: DISCONTINUED | OUTPATIENT
Start: 2020-03-01 | End: 2020-03-03 | Stop reason: HOSPADM

## 2020-03-01 RX ORDER — SODIUM CHLORIDE 0.9 % (FLUSH) 0.9 %
5-40 SYRINGE (ML) INJECTION EVERY 8 HOURS
Status: DISCONTINUED | OUTPATIENT
Start: 2020-03-01 | End: 2020-03-01 | Stop reason: HOSPADM

## 2020-03-01 RX ORDER — LIDOCAINE HYDROCHLORIDE AND EPINEPHRINE 15; 5 MG/ML; UG/ML
INJECTION, SOLUTION EPIDURAL AS NEEDED
Status: DISCONTINUED | OUTPATIENT
Start: 2020-03-01 | End: 2020-03-01 | Stop reason: HOSPADM

## 2020-03-01 RX ADMIN — PENICILLIN G POTASSIUM 2.5 MILLION UNITS: 20000000 POWDER, FOR SOLUTION INTRAVENOUS at 09:46

## 2020-03-01 RX ADMIN — LIDOCAINE HYDROCHLORIDE 5 ML: 10 INJECTION, SOLUTION INFILTRATION; PERINEURAL at 08:04

## 2020-03-01 RX ADMIN — ROPIVACAINE HYDROCHLORIDE 12 ML/HR: 10 INJECTION, SOLUTION EPIDURAL at 08:12

## 2020-03-01 RX ADMIN — FENTANYL CITRATE 100 MCG: 50 INJECTION, SOLUTION INTRAMUSCULAR; INTRAVENOUS at 08:10

## 2020-03-01 RX ADMIN — PENICILLIN G POTASSIUM 2.5 MILLION UNITS: 20000000 POWDER, FOR SOLUTION INTRAVENOUS at 02:18

## 2020-03-01 RX ADMIN — BUTORPHANOL TARTRATE 2 MG: 2 INJECTION, SOLUTION INTRAMUSCULAR; INTRAVENOUS at 02:43

## 2020-03-01 RX ADMIN — BUTORPHANOL TARTRATE 2 MG: 2 INJECTION, SOLUTION INTRAMUSCULAR; INTRAVENOUS at 05:41

## 2020-03-01 RX ADMIN — Medication 2 MILLI-UNITS/MIN: at 05:55

## 2020-03-01 RX ADMIN — BUTORPHANOL TARTRATE 2 MG: 2 INJECTION, SOLUTION INTRAMUSCULAR; INTRAVENOUS at 00:39

## 2020-03-01 RX ADMIN — IBUPROFEN 800 MG: 400 TABLET, FILM COATED ORAL at 22:57

## 2020-03-01 RX ADMIN — SODIUM CHLORIDE, SODIUM LACTATE, POTASSIUM CHLORIDE, AND CALCIUM CHLORIDE 125 ML/HR: 600; 310; 30; 20 INJECTION, SOLUTION INTRAVENOUS at 05:55

## 2020-03-01 RX ADMIN — PENICILLIN G POTASSIUM 2.5 MILLION UNITS: 20000000 POWDER, FOR SOLUTION INTRAVENOUS at 05:45

## 2020-03-01 RX ADMIN — LIDOCAINE HYDROCHLORIDE,EPINEPHRINE BITARTRATE 2 ML: 15; .005 INJECTION, SOLUTION EPIDURAL; INFILTRATION; INTRACAUDAL; PERINEURAL at 08:08

## 2020-03-01 RX ADMIN — LIDOCAINE HYDROCHLORIDE,EPINEPHRINE BITARTRATE 5 ML: 15; .005 INJECTION, SOLUTION EPIDURAL; INFILTRATION; INTRACAUDAL; PERINEURAL at 08:09

## 2020-03-01 NOTE — H&P
Ostetrical History and Physical    Subjective:     Date of Admission: 3/1/2020    Patient is a 25 y.o.  female admitted with srom in labor srom clear at 9 pm labored on her own. gbs pos on pcn. For Obstetric history, see kaileyl.    Past Medical History:   Diagnosis Date    Anemia     Asthma     last attack years ago    Recurrent UTI       History reviewed. No pertinent surgical history. Prior to Admission medications    Medication Sig Start Date End Date Taking? Authorizing Provider   terconazole (TERAZOL 7) 0.4 % vaginal cream Insert 1 Applicator into vagina nightly. 20  Yes Floiftikhar Muro NP   prenatal vit-calcium-iron-fa (PRENATAL PLUS, CALCIUM CARB,) 27 mg iron- 1 mg tab Take 1 Tab by mouth daily. Yes Provider, Historical   loratadine (CLARITIN) 10 mg tablet Take 1 Tab by mouth daily. 20   Antonio Corral NP   ondansetron (ZOFRAN ODT) 8 mg disintegrating tablet Take 1 Tab by mouth every eight (8) hours as needed for Nausea. 20   Corral, Antonio Payton NP   ferrous sulfate (IRON) 325 mg (65 mg iron) EC tablet Take 1 Tab by mouth daily. 19   Corral, Antonio Payton NP   multivitamin (ONE A DAY) tablet Take 1 Tab by mouth daily. Provider, Historical     No Known Allergies   Social History     Tobacco Use    Smoking status: Never Smoker    Smokeless tobacco: Never Used   Substance Use Topics    Alcohol use: Not Currently      Family History   Problem Relation Age of Onset    Stroke Maternal Grandfather         Review of Systems    Objective:     Blood pressure 140/72, pulse 90, temperature 98.2 °F (36.8 °C), resp. rate 16, height 5' 6\" (1.676 m), weight 106.1 kg (234 lb), SpO2 99 %, currently breastfeeding. Temp (24hrs), Av.1 °F (36.7 °C), Min:97.8 °F (36.6 °C), Max:98.7 °F (37.1 °C)         07 -  1900  In: -   Out: 725 [Urine:725]  No intake/output data recorded.     [facility]    Pelvic: [id number], Effaced:> 50%Station:+1  Data Review:   Recent Results (from the past 24 hour(s))   CBC WITH AUTOMATED DIFF    Collection Time: 02/29/20  9:40 PM   Result Value Ref Range    WBC 9.5 4.6 - 13.2 K/uL    RBC 4.43 4.20 - 5.30 M/uL    HGB 11.9 (L) 12.0 - 16.0 g/dL    HCT 38.0 35.0 - 45.0 %    MCV 85.8 74.0 - 97.0 FL    MCH 26.9 24.0 - 34.0 PG    MCHC 31.3 31.0 - 37.0 g/dL    RDW 15.3 (H) 11.6 - 14.5 %    PLATELET 925 571 - 400 K/uL    MPV 12.3 (H) 9.2 - 11.8 FL    NEUTROPHILS 69 40 - 73 %    LYMPHOCYTES 19 (L) 21 - 52 %    MONOCYTES 11 (H) 3 - 10 %    EOSINOPHILS 1 0 - 5 %    BASOPHILS 0 0 - 2 %    ABS. NEUTROPHILS 6.6 1.8 - 8.0 K/UL    ABS. LYMPHOCYTES 1.8 0.9 - 3.6 K/UL    ABS. MONOCYTES 1.1 0.05 - 1.2 K/UL    ABS. EOSINOPHILS 0.1 0.0 - 0.4 K/UL    ABS.  BASOPHILS 0.0 0.0 - 0.1 K/UL    DF AUTOMATED     TYPE & SCREEN    Collection Time: 02/29/20  9:40 PM   Result Value Ref Range    Crossmatch Expiration 03/03/2020     ABO/Rh(D) A POSITIVE     Antibody screen NEG      Monitor:  Reactivity:present Variability:present Baseline:abnormal findings variables with ctx with pushingto 90-100bpm    Assessment:pushing with variables     Principal Problem:    High-risk pregnancy in third trimester (10/15/2019)    Active Problems:    Obesity affecting pregnancy in third trimester (9/27/2019)      Overview: Early 1 hr GTT normal.      NST's biweekly at 37 wks      Female, EFW 42% at morph      Anemia in pregnancy, third trimester (12/9/2019)      Overview: 10.8 on 12/5/19 - started on ferrous sulfate once daily      GBS (group B Streptococcus carrier), +RV culture, currently pregnant (2/9/2020)        Plan:anticipate delivery soon     Prophylaxis:  Deep Vein Thrombosis Protocol Active:Yes    Check labs:    Check  Prenatal:    Disposition    Total time spent with patient:In-Patient    Signed By: Gelacio Tan MD                         March 1, 2020

## 2020-03-01 NOTE — ANESTHESIA PREPROCEDURE EVALUATION
Relevant Problems   No relevant active problems       Anesthetic History   No history of anesthetic complications            Review of Systems / Medical History  Patient summary reviewed, nursing notes reviewed and pertinent labs reviewed    Pulmonary            Asthma        Neuro/Psych   Within defined limits           Cardiovascular                  Exercise tolerance: >4 METS     GI/Hepatic/Renal  Within defined limits              Endo/Other        Morbid obesity     Other Findings              Physical Exam    Airway  Mallampati: III  TM Distance: 4 - 6 cm  Neck ROM: normal range of motion   Mouth opening: Normal     Cardiovascular  Regular rate and rhythm,  S1 and S2 normal,  no murmur, click, rub, or gallop  Rhythm: regular  Rate: normal         Dental  No notable dental hx       Pulmonary  Breath sounds clear to auscultation               Abdominal  GI exam deferred       Other Findings            Anesthetic Plan    ASA: 3  Anesthesia type: epidural            Anesthetic plan and risks discussed with: Patient and Spouse      Risks of bleeding, infection, nerve injury, failed block, spinal tap causing headache and requiring epidural blood patch, back pain, and failed block discussed. Procedure described as elective. Pt understands and desires to proceed. Ivonne Stockton

## 2020-03-01 NOTE — PROGRESS NOTES
Verbal report received from ISABELA Mireles RN on [employer]   being received from L&D for routine progression of care      Report consisted of patients Situation, Background, Assessment and   Recommendations(SBAR). Information from the following report(s) SBAR, Kardex, Procedure Summary and MAR was reviewed with the receiving nurse. Opportunity for questions and clarification was provided. Assessment completed upon patients arrival to unit and care assumed. Oriented to room and mother infant safety. Instructed to call for excessive bleeding or clots and assistance to bathroom first 2 times.

## 2020-03-01 NOTE — ROUTINE PROCESS
2015 Received ambulatory to L & D this 24 y/o  at 40 2/7 weeks EGA with complaint of SROM with large gush of clear fluid at . + fetal movement. 2030 SVE /-2 vtx. Haroldo Damon notified. Orders noted to admit and begin GBS treatment. 2355 Off monitor to ambulate in room. 0030 requesting pain medication reports contractions are 7/10, SVE 3/90/-2. 
 
0040 Stadol 2 mg given SIVP  SR up X2. Family at bedside. 0710 Bedside and Verbal shift change report given to [address] (oncoming nurse) by ISABELA Meyers RN (offgoing nurse). Report included the following information SBAR, Kardex, Intake/Output and MAR.

## 2020-03-01 NOTE — PROGRESS NOTES
Problem: Vaginal Delivery: Day of Deliver-Laboring  Goal: Off Pathway (Use only if patient is Off Pathway)  Outcome: Progressing Towards Goal  Goal: Activity/Safety  Outcome: Progressing Towards Goal  Goal: Consults, if ordered  Outcome: Progressing Towards Goal  Goal: Diagnostic Test/Procedures  Outcome: Progressing Towards Goal  Goal: Nutrition/Diet  Outcome: Progressing Towards Goal  Goal: Discharge Planning  Outcome: Progressing Towards Goal  Goal: Medications  Outcome: Progressing Towards Goal  Goal: Respiratory  Outcome: Progressing Towards Goal  Goal: Treatments/Interventions/Procedures  Outcome: Progressing Towards Goal  Goal: *Vital signs within defined limits  Outcome: Progressing Towards Goal  Goal: *Labs within defined limits  Outcome: Progressing Towards Goal  Goal: *Hemodynamically stable  Outcome: Progressing Towards Goal  Goal: *Optimal pain control at patient's stated goal  Outcome: Progressing Towards Goal

## 2020-03-01 NOTE — PROGRESS NOTES
0700- Bedside and Verbal shift change report given to ISABELA Mireles RN  (oncoming nurse) by ISABELA Borges RN (offgoing nurse). Report included the following information SBAR, Kardex and MAR. Patient resting in bed on left side, IV infusing, TOCO and ultrasound on abdomin    0722- Paged anesthesia for epidural     6771- Anesthesia at bedside    0802- Timeout complete     0809- Catheter in place    0810- Test dose given    0948- Patient tilted to left side     0957- spoke with Dr. Kovacs Class, update given on patient    46- Patient placed on peanut ball on right side     1117- Patient turned to left side, peanut ball placed    1213- Dr. Kovacs Class at bedside    1217- TOB    1514- Patient up to restroom, pericare performed, tolerated well    1650- TRANSFER - OUT REPORT:    Verbal report given to Zulma Santos (name) on Uma Vaughan  being transferred to Mother Baby (unit) for routine progression of care       Report consisted of patients Situation, Background, Assessment and   Recommendations(SBAR). Information from the following report(s) SBAR, Kardex and MAR was reviewed with the receiving nurse. Lines:   Peripheral IV 02/29/20 Left Antecubital (Active)   Site Assessment Clean, dry, & intact 3/1/2020  7:00 AM   Phlebitis Assessment 0 3/1/2020  7:00 AM   Infiltration Assessment 0 3/1/2020  7:00 AM   Dressing Status Clean, dry, & intact 3/1/2020  7:00 AM   Dressing Type Transparent 3/1/2020  7:00 AM   Hub Color/Line Status Green; Infusing 3/1/2020  7:00 AM   Alcohol Cap Used Yes 2/29/2020 11:07 PM        Opportunity for questions and clarification was provided.       Patient transported with:   Registered Nurse

## 2020-03-01 NOTE — PROGRESS NOTES
Dr. Pablo Lombard notified of pt's SVE and status.  Dr. Pablo Lombard reports that she is on her way into the hospital.

## 2020-03-01 NOTE — PROGRESS NOTES
Delivery Note    Obstetrician: chrystal    Assistant: none    Pre-Delivery Diagnosis: Term pregnancy    Post-Delivery Diagnosis: Female    Intrapartum Event: None    Procedure: Spontaneous vaginal delivery    Epidural: YES    Monitor:  Fetal Heart Tones - Internal and Uterine Contractions - External    Indications for instrumental delivery: none    Estimated Blood Loss: 300cc    Episiotomy: none    Laceration(s):  2nd degree left vaginal tear .  smalll right hymenal tear    Laceration(s) repair: YES with 2-0 vicryl    Presentation: Cephalic    Fetal Description: xiong    Fetal Position: Occiput Anterior    Birth Weight:     Birth Length:     Apgar - One Minute: 9    Apgar - Five Minutes: 9    Umbilical Cord: 3 vessels present    Specimens: none           Complications:  none           Cord Blood Results:   Information for the patient's :  Adriana Hernandez [[id number]]   No results found for: PCTABR, [address], PCTDIG, BILI, ABORH, ABORHEXT    Prenatal Labs:     Lab Results   Component Value Date/Time    ABO/Rh(D) A POSITIVE 2020 09:40 PM    HBsAg, External NEG 2019    HIV, External NEG 2019    Rubella, External Immune 2019    RPR, External NR 2019    Chlamydia, External neg 2019    GrBStrep, External positive 2020        Attending Attestation: I was present and scrubbed for the entire procedure    Signed By:  Aisha Pruitt MD     2020

## 2020-03-02 LAB
BASOPHILS # BLD: 0 K/UL (ref 0–0.1)
BASOPHILS NFR BLD: 0 % (ref 0–2)
DIFFERENTIAL METHOD BLD: ABNORMAL
EOSINOPHIL # BLD: 0.1 K/UL (ref 0–0.4)
EOSINOPHIL NFR BLD: 0 % (ref 0–5)
ERYTHROCYTE [DISTWIDTH] IN BLOOD BY AUTOMATED COUNT: 15.6 % (ref 11.6–14.5)
HCT VFR BLD AUTO: 32.4 % (ref 35–45)
HGB BLD-MCNC: 10.1 G/DL (ref 12–16)
LYMPHOCYTES # BLD: 2 K/UL (ref 0.9–3.6)
LYMPHOCYTES NFR BLD: 14 % (ref 21–52)
MCH RBC QN AUTO: 27 PG (ref 24–34)
MCHC RBC AUTO-ENTMCNC: 31.2 G/DL (ref 31–37)
MCV RBC AUTO: 86.6 FL (ref 74–97)
MONOCYTES # BLD: 1.3 K/UL (ref 0.05–1.2)
MONOCYTES NFR BLD: 9 % (ref 3–10)
NEUTS SEG # BLD: 10.8 K/UL (ref 1.8–8)
NEUTS SEG NFR BLD: 77 % (ref 40–73)
PLATELET # BLD AUTO: 176 K/UL (ref 135–420)
PMV BLD AUTO: 12.7 FL (ref 9.2–11.8)
RBC # BLD AUTO: 3.74 M/UL (ref 4.2–5.3)
WBC # BLD AUTO: 14.1 K/UL (ref 4.6–13.2)

## 2020-03-02 PROCEDURE — 74011250637 HC RX REV CODE- 250/637: Performed by: OBSTETRICS & GYNECOLOGY

## 2020-03-02 PROCEDURE — 36415 COLL VENOUS BLD VENIPUNCTURE: CPT

## 2020-03-02 PROCEDURE — 65270000029 HC RM PRIVATE

## 2020-03-02 PROCEDURE — 85025 COMPLETE CBC W/AUTO DIFF WBC: CPT

## 2020-03-02 RX ADMIN — IBUPROFEN 800 MG: 400 TABLET, FILM COATED ORAL at 17:29

## 2020-03-02 RX ADMIN — IBUPROFEN 800 MG: 400 TABLET, FILM COATED ORAL at 09:17

## 2020-03-02 NOTE — PROGRESS NOTES
0720 - Bedside and Verbal shift change report given to DUKE Wallace (oncoming nurse) and Hima Desir (orienting nurse) by JENNIFER Berumen RN (offgoing nurse). Report included the following information SBAR, Kardex, Intake/Output, MAR and Recent Results. 2824 - VS taken. Full assessment done. Mom states she wants another heat pack for her back (edipural site pain) and ibuprofen. Pt also states that her stitches hurt a bit. Pt asked about doing a sitz bath at home. Informed pt that was ok, as long as it helps/feels good, and not to have water too warm. Told pt I'd be back with pain medications. Bed in lowest position. Call bell in reach. 5550 - Gave pt ibuprofen for pain. Also gave warm pack for back, and dermaplast spray for perineal area. Educated pt on it feeling cold, but that it will help ease pain and itching associated with stitches. Told pt I'd be back to check on pain level in one hour. Bed in lowest position. Call bell in reach. 1210 - Attempted pain assessment. Pt in bathroom. Told her I'd return. 1230 - Attempted pain assessment.  in with mom and baby. Told mom I'd return. 1249 - Pain assessment done. Pt still rates pain at a 1, even with ibuprofen. Pt states she has not tried the [address] yet, but plans to. Pt states that the heat pack is helpful, and she does not need a new pack at this time. IV removed. All pieces intact. 2x2 w/tape applied. No active bleeding visible. Pt advised to apply pressure for 2-3 minutes. Patient states no other needs at this time. Bed in lowest position. Call bell within reach. 1608 - Afternoon assessment done. Vitals taken. Pt rates pain a 2. Explained to pt that she is not due for ibuprofen for another hour. Patient states no other needs at this time. Bed in lowest position; call bell within reach. 1729 - Gave ibuprofen for pain.   Encouraged mom to use Dermaplast on perineum, but she states that she is nervous about using it, afraid that it would sting. I encouraged her to use it, then use a cold pack for more relief. 1900 - Reassessed pt's pain. Pt states ibuprofen helped. Pt ate dinner. Educated pt on importance for healthy diet with grains and fiber to aid in healing and prevention of constipation. 1915 - Bedside and Verbal shift change report given to DEON Rachel RN (oncoming nurse) by Carmencita Negrete RN (offgoing nurse). Report included the following information SBAR, Procedure Summary, Intake/Output and MAR.

## 2020-03-02 NOTE — ANESTHESIA POSTPROCEDURE EVALUATION
3/2/2020  1:41 PM    Laboring Epidural Follow-up Note     Referring physician: Cyrus Abbasi MD   Patient status post vaginal delivery with labor epidural    Visit Vitals  /66 (BP 1 Location: Right arm, BP Patient Position: Sitting)   Pulse (!) 103   Temp 36.5 °C (97.7 °F)   Resp 20   Ht 5' 6\" (1.676 m)   Wt 106.1 kg (234 lb)   SpO2 100%   Breastfeeding Yes   BMI 37.77 kg/m²       Epidural removed by L&D staff  No sedation, pruritis noted. Adequate analgesia.   No obvious anesthesia complications          Jazmin Mery Arango CRNA

## 2020-03-02 NOTE — ROUTINE PROCESS
Bedside and Verbal shift change report given to JENNIFER Chowdhury RN  by Balbina Baez RN . Report given with Rosa M LAINEZ and MAR.

## 2020-03-02 NOTE — PROGRESS NOTES
Called Dr. Cari Castillo due to pt vital signs (see flowsheet.) Orders for morning CBC W/diff to be preformed this AM in place of H/H. Temps to be monitored through the night.

## 2020-03-02 NOTE — PROGRESS NOTES
Bedside and Verbal shift change report given to DUKE Ruelas RN (oncoming nurse) by Ronna Mills. Yang Hicks RN (offgoing nurse). Report included the following information SBAR, Kardex, Intake/Output, MAR and Recent Results. 1100- Reviewed charting for Shannon Valle RN and discussed. 1700- Charting reviewed and discussed.

## 2020-03-02 NOTE — PROGRESS NOTES
Post-Partum Day Number 1 Progress Note    Get Bryson     Assessment:   Hospital Problems  Date Reviewed: 3/1/2020          Codes Class Noted POA    40 weeks gestation of pregnancy ICD-10-CM: Z3A.40  ICD-9-CM: V22.2  3/1/2020 Unknown        Uterine contractions during pregnancy ICD-10-CM: O62.2  ICD-9-CM: 661.20  3/1/2020 Unknown        GBS (group B Streptococcus carrier), +RV culture, currently pregnant ICD-10-CM: O99.820  ICD-9-CM: V23.89, V02.51  2020 Yes        Anemia in pregnancy, third trimester ICD-10-CM: O99.013  ICD-9-CM: 648.23  2019 Yes    Overview Signed 2019  3:06 PM by Mandeep Choi NP     10.8 on 19 - started on ferrous sulfate once daily             * (Principal) High-risk pregnancy in third trimester ICD-10-CM: O09.93  ICD-9-CM: V23.9  10/15/2019 Yes        Obesity affecting pregnancy in third trimester ICD-10-CM: O99.213  ICD-9-CM: 649.13  2019 Yes    Overview Addendum 10/15/2019  9:59 AM by Sheri Turcios MD     Early 1 hr GTT normal.  NST's biweekly at 37 wks  Female, EFW 42% at morph                 Doing well, post partum day 1    Plan:  1. Continue routine postpartum and perineal care as well as maternal education. 2. Plan for discharge tomorrow. Information for the patient's :  [place] [[id number]]   Vaginal, Spontaneous   Patient doing well without significant complaint. Voiding without difficulty, normal lochia. Patient is trying to breast feed.     Current Facility-Administered Medications   Medication Dose Route Frequency    oxytocin (PITOCIN) 30 units/500 ml NS  0-20 ren-units/min IntraVENous TITRATE       Vitals:  Visit Vitals  /66 (BP 1 Location: Right arm, BP Patient Position: Sitting)   Pulse (!) 103   Temp 97.7 °F (36.5 °C)   Resp 20   Ht 5' 6\" (1.676 m)   Wt 234 lb (106.1 kg)   SpO2 100%   Breastfeeding Yes   BMI 37.77 kg/m²     Temp (24hrs), Av.6 °F (37 °C), Min:97.7 °F (36.5 °C), Max:99.5 °F (37.5 °C)        Exam:   Patient without distress. Abdomen soft, fundus firm, nontender                Perineum with normal lochia noted. Lower extremities are negative for swelling, cords or tenderness. Labs:     Lab Results   Component Value Date/Time    WBC 14.1 (H) 03/02/2020 04:19 AM    WBC 9.5 02/29/2020 09:40 PM    WBC 9.9 09/20/2019 01:16 PM    HGB 10.1 (L) 03/02/2020 04:19 AM    HGB 11.9 (L) 02/29/2020 09:40 PM    HGB 10.8 (L) 12/05/2019 09:50 AM    HCT 32.4 (L) 03/02/2020 04:19 AM    HCT 38.0 02/29/2020 09:40 PM    HCT 40.1 09/20/2019 01:16 PM    PLATELET 884 07/78/9118 04:19 AM    PLATELET 249 65/13/1018 09:40 PM    PLATELET 318 30/48/2883 01:16 PM    Hgb, External 11.9 02/29/2020    Hct, External 38 02/29/2020    Platelet cnt., External 159 02/29/2020       Recent Results (from the past 24 hour(s))   CBC WITH AUTOMATED DIFF    Collection Time: 03/02/20  4:19 AM   Result Value Ref Range    WBC 14.1 (H) 4.6 - 13.2 K/uL    RBC 3.74 (L) 4.20 - 5.30 M/uL    HGB 10.1 (L) 12.0 - 16.0 g/dL    HCT 32.4 (L) 35.0 - 45.0 %    MCV 86.6 74.0 - 97.0 FL    MCH 27.0 24.0 - 34.0 PG    MCHC 31.2 31.0 - 37.0 g/dL    RDW 15.6 (H) 11.6 - 14.5 %    PLATELET 479 497 - 207 K/uL    MPV 12.7 (H) 9.2 - 11.8 FL    NEUTROPHILS 77 (H) 40 - 73 %    LYMPHOCYTES 14 (L) 21 - 52 %    MONOCYTES 9 3 - 10 %    EOSINOPHILS 0 0 - 5 %    BASOPHILS 0 0 - 2 %    ABS. NEUTROPHILS 10.8 (H) 1.8 - 8.0 K/UL    ABS. LYMPHOCYTES 2.0 0.9 - 3.6 K/UL    ABS. MONOCYTES 1.3 (H) 0.05 - 1.2 K/UL    ABS. EOSINOPHILS 0.1 0.0 - 0.4 K/UL    ABS.  BASOPHILS 0.0 0.0 - 0.1 K/UL    DF AUTOMATED

## 2020-03-03 VITALS
DIASTOLIC BLOOD PRESSURE: 53 MMHG | SYSTOLIC BLOOD PRESSURE: 93 MMHG | TEMPERATURE: 98.1 F | HEART RATE: 96 BPM | OXYGEN SATURATION: 100 % | HEIGHT: 66 IN | WEIGHT: 234 LBS | RESPIRATION RATE: 13 BRPM | BODY MASS INDEX: 37.61 KG/M2

## 2020-03-03 PROBLEM — O47.9 UTERINE CONTRACTIONS DURING PREGNANCY: Status: RESOLVED | Noted: 2020-03-01 | Resolved: 2020-03-03

## 2020-03-03 PROBLEM — O99.820 GBS (GROUP B STREPTOCOCCUS CARRIER), +RV CULTURE, CURRENTLY PREGNANT: Status: RESOLVED | Noted: 2020-02-09 | Resolved: 2020-03-03

## 2020-03-03 PROBLEM — O99.013 ANEMIA IN PREGNANCY, THIRD TRIMESTER: Status: RESOLVED | Noted: 2019-12-09 | Resolved: 2020-03-03

## 2020-03-03 PROBLEM — O99.213 OBESITY AFFECTING PREGNANCY IN THIRD TRIMESTER: Status: RESOLVED | Noted: 2019-09-27 | Resolved: 2020-03-03

## 2020-03-03 PROBLEM — Z3A.40 40 WEEKS GESTATION OF PREGNANCY: Status: RESOLVED | Noted: 2020-03-01 | Resolved: 2020-03-03

## 2020-03-03 PROBLEM — O09.93 HIGH-RISK PREGNANCY IN THIRD TRIMESTER: Status: RESOLVED | Noted: 2019-10-15 | Resolved: 2020-03-03

## 2020-03-03 PROCEDURE — 74011250637 HC RX REV CODE- 250/637: Performed by: OBSTETRICS & GYNECOLOGY

## 2020-03-03 RX ORDER — IBUPROFEN 800 MG/1
800 TABLET ORAL
Qty: 30 TAB | Refills: 1 | Status: SHIPPED | OUTPATIENT
Start: 2020-03-03 | End: 2021-01-12

## 2020-03-03 RX ORDER — FERROUS SULFATE 325(65) MG
325 TABLET, DELAYED RELEASE (ENTERIC COATED) ORAL EVERY OTHER DAY
Qty: 90 TAB | Refills: 0 | Status: SHIPPED | OUTPATIENT
Start: 2020-03-03 | End: 2021-04-16

## 2020-03-03 RX ADMIN — IBUPROFEN 800 MG: 400 TABLET, FILM COATED ORAL at 08:16

## 2020-03-03 NOTE — PROGRESS NOTES
0800 - Full assessment done on pt. Pt had been sleeping. Pt states pain level as a 3, and requests ibuprofen and new heat pack. Discussed with pt about calling to get appointment with peds for baby within 48 hours of discharge. Pt states she will call this morning, once offices open for business. Trace edema in lower extremities noted. I suggested to pt to try ambulating, perhaps in hallway, and to elevate legs when lying in bed. Bed locked and in lowest position; instructed to call if needed; call bell within reach. 9200 - Gave pt ibuprofen and heat pack. Told pt I would be back to check on her pain level in one hour. Miguel came to room with baby to report to mom. Baby given to mom to feed at [address] [place] [address]  - Pain reassessment done. Pt given insurance paperwork to fill out. [phone] - Discharged home via wheelchair in stable condition.

## 2020-03-03 NOTE — PROGRESS NOTES
Post-Partum Day Number 2 Progress Note    Bryanna Aldrich     Assessment:   Hospital Problems  Date Reviewed: 3/1/2020          Codes Class Noted POA    40 weeks gestation of pregnancy ICD-10-CM: Z3A.40  ICD-9-CM: V22.2  3/1/2020 Unknown        Uterine contractions during pregnancy ICD-10-CM: O62.2  ICD-9-CM: 661.20  3/1/2020 Unknown        GBS (group B Streptococcus carrier), +RV culture, currently pregnant ICD-10-CM: O99.820  ICD-9-CM: V23.89, V02.51  2020 Yes        Anemia in pregnancy, third trimester ICD-10-CM: O99.013  ICD-9-CM: 648.23  2019 Yes    Overview Signed 2019  3:06 PM by Chloe Allen NP     10.8 on 19 - started on ferrous sulfate once daily             * (Principal) High-risk pregnancy in third trimester ICD-10-CM: O09.93  ICD-9-CM: V23.9  10/15/2019 Yes        Obesity affecting pregnancy in third trimester ICD-10-CM: O99.213  ICD-9-CM: 649.13  2019 Yes    Overview Addendum 10/15/2019  9:59 AM by Rakesh Muñiz MD     Early 1 hr GTT normal.  NST's biweekly at 37 wks  Female, EFW 42% at morph                 Doing well, post partum day 2    Plan:   1. Discharge home today  2. Follow up in office in 6 weeks with Rakesh Muñiz MD   3. Post partum activity advised, diet as tolerated  4. Discharge Medications: ibuprofen and medications prior to admission    Information for the patient's :  Bertha Renee [[id number]]   Vaginal, Spontaneous   Patient doing well without significant complaint. Voiding without difficulty, normal lochia. Patient is currently bottle feeding but plans to breast feed when she goes home. She has a breast pump at home. Would like ibuprofen for cramping.     Current Facility-Administered Medications   Medication Dose Route Frequency    oxytocin (PITOCIN) 30 units/500 ml NS  0-20 ren-units/min IntraVENous TITRATE       Vitals:  Visit Vitals  /60 (BP 1 Location: Right arm, BP Patient Position: At rest)   Pulse 100 Temp 98.5 °F (36.9 °C)   Resp 18   Ht 5' 6\" (1.676 m)   Wt 234 lb (106.1 kg)   SpO2 99%   Breastfeeding Yes   BMI 37.77 kg/m²     Temp (24hrs), Av.1 °F (36.7 °C), Min:97.7 °F (36.5 °C), Max:98.5 °F (36.9 °C)      Exam:         Patient without distress. Abdomen soft, fundus firm, nontender                 Lower extremities are negative for swelling, cords or tenderness. Labs:     Lab Results   Component Value Date/Time    WBC 14.1 (H) 2020 04:19 AM    WBC 9.5 2020 09:40 PM    WBC 9.9 2019 01:16 PM    HGB 10.1 (L) 2020 04:19 AM    HGB 11.9 (L) 2020 09:40 PM    HGB 10.8 (L) 2019 09:50 AM    HCT 32.4 (L) 2020 04:19 AM    HCT 38.0 2020 09:40 PM    HCT 40.1 2019 01:16 PM    PLATELET 765  04:19 AM    PLATELET 361  09:40 PM    PLATELET 902  01:16 PM    Hgb, External 11.9 2020    Hct, External 38 2020    Platelet cnt., External 159 2020       No results found for this or any previous visit (from the past 24 hour(s)).

## 2020-03-03 NOTE — DISCHARGE INSTRUCTIONS
POST DELIVERY DISCHARGE INSTRUCTIONS    Name: Lake Pimentel  YOB: 1995  Primary Diagnosis: Active Problems:    Spontaneous vaginal delivery (3/3/2020)        General:     Diet/Diet Restrictions:  Eight 8-ounce glasses of fluid daily (water, juices); avoid excessive caffeine intake. Meals/snacks as desired which are high in fiber and carbohydrates and low in fat and cholesterol. Physical Activity / Restrictions / Safety:     Avoid heavy lifting, no more that 8 lbs. For 2-3 weeks; limit use of stairs to 2 times daily for the first week home. No driving for one week. Avoid intercourse 4-6 weeks, no douching or tampon use. Check with obstetrician before starting or resuming an exercise program.         Discharge Instructions/Special Treatment/Home Care Needs:     Continue prenatal vitamins. Continue to use squirt bottle with warm water on your episiotomy after each bathroom use until bleeding stops. If steri-strips applied to your incision, remove in 7-10 days. Call your doctor for the following:     Fever over 101 degrees by mouth. Vaginal bleeding heavier than a normal menstrual period or clot larger than a golf ball. Red streaks or increased swelling of legs, painful red streaks on your breast.  Painful urination, constipation and increased pain or swelling or discharge with your incision. If you feel extremely anxious or overwhelmed. If you have thoughts of harming yourself and/or your baby. Pain Management:     Pain Management:   Take Acetaminophen (Tylenol) or Ibuprofen (Advil, Motrin), as directed for pain. Use a warm Sitz bath 3 times daily to relieve episiotomy or hemorrhoidal discomfort. Heating pad to  incision as needed. For hemorrhoidal discomfort, use Tucks and Anusol cream as needed and directed. Follow-Up Care:      These are general instructions for a healthy lifestyle:    No smoking/ No tobacco products/ Avoid exposure to second hand smoke    Surgeon General's Warning:  Quitting smoking now greatly reduces serious risk to your health. Obesity, smoking, and sedentary lifestyle greatly increases your risk for illness    A healthy diet, regular physical exercise & weight monitoring are important for maintaining a healthy lifestyle    Recognize signs and symptoms of STROKE:    F-face looks uneven    A-arms unable to move or move unevenly    S-speech slurred or non-existent    T-time-call 911 as soon as signs and symptoms begin-DO NOT go       Back to bed or wait to see if you get better-TIME IS BRAIN. Patient Education        Depression After Childbirth: Care Instructions  Your Care Instructions    Many women get the \"baby blues\" during the first few days after childbirth. You may lose sleep, feel irritable, and cry easily. You may feel happy one minute and sad the next. Hormone changes are one cause of these emotional changes. Also, the demands of a new baby, along with visits from relatives or other family needs, add to a mother's stress. The \"baby blues\" often peak around the fourth day. Then they ease up in less than 2 weeks. If your moodiness or anxiety lasts for more than 2 weeks, or if you feel like life is not worth living, you may have postpartum depression. This is different for each mother. Some mothers with serious depression may worry intensely about their infant's well-being. Others may feel distant from their child. Some mothers might even feel that they might harm their baby. A mother may have signs of paranoia, wondering if someone is watching her. Depression is not a sign of weakness. It is a medical condition that requires treatment. Medicine and counseling often work well to reduce depression. Talk to your doctor about taking antidepressant medicine while breastfeeding. Follow-up care is a key part of your treatment and safety. Be sure to make and go to all appointments, and call your doctor if you are having problems.  It's also a good idea to know your test results and keep a list of the medicines you take. How do you know if you are depressed? With all the changes in your life, you may not know if you are depressed. Pregnancy sometimes causes changes in how you feel that are similar to the symptoms of depression. Symptoms of depression include:  · Feeling sad or hopeless and losing interest in daily activities. These are the most common symptoms of depression. · Sleeping too much or not enough. · Feeling tired. You may feel as if you have no energy. · Eating too much or too little. · Writing or talking about death, such as writing suicide notes or talking about guns, knives, or pills. Keep the numbers for these national suicide hotlines: 0-123-656-TALK (9-904.971.1858) and 7-617-ZHOLGZB (5-158.575.7695). If you or someone you know talks about suicide or feeling hopeless, get help right away. How can you care for yourself at home? · Be safe with medicines. Take your medicines exactly as prescribed. Call your doctor if you think you are having a problem with your medicine. · Eat a healthy diet so that you can keep up your energy. · Get regular daily exercise, such as walks, to help improve your mood. · Get as much sunlight as possible. Keep your shades and curtains open. Get outside as much as you can. · Avoid using alcohol or other substances to feel better. · Get as much rest and sleep as possible. Avoid doing too much. Being too tired can increase depression. · Play stimulating music throughout your day and soothing music at night. · Schedule outings and visits with friends and family. Ask them to call you regularly, so that you do not feel alone. · Ask for help with preparing food and other daily tasks. Family and friends are often happy to help a mother with a . · Be honest with yourself and those who care about you. Tell them about your struggle. · Join a support group of new mothers.  No one can better understand the challenges of caring for a  than other new mothers. · If you feel like life is not worth living or are feeling hopeless, get help right away. Keep the numbers for these national suicide hotlines: 8-175-308-TALK (9-247.499.3817) and 9-077-PMQCZEE (8-776.391.3762). When should you call for help? Call 911 anytime you think you may need emergency care. For example, call if:    · You feel you cannot stop from hurting yourself, your baby, or someone else.   [facility] your doctor now or seek immediate medical care if:    · You are having trouble caring for yourself or your baby.     · You hear voices.   Ester Rose closely for changes in your health, and be sure to contact your doctor if:    · You have problems with your depression medicine.     · You do not get better as expected. Where can you learn more? Go to http://ramila-sharmaine.info/. Enter X018 in the search box to learn more about \"Depression After Childbirth: Care Instructions. \"  Current as of: May 28, 2019  Content Version: 12.2  © 0402-6573 [employer]. Care instructions adapted under license by [employer] (which disclaims liability or warranty for this information). If you have questions about a medical condition or this instruction, always ask your healthcare professional. [address] any warranty or liability for your use of this information.   Patient armband removed and shredded

## 2020-03-03 NOTE — PROGRESS NOTES
D/C teaching completed. Copy of D/C teaching/instructions given to Pt. Pt verbalizes understanding. Pt given opportunity for questions.  Pt denies comments/concerns/questions at this time

## 2020-03-03 NOTE — PROGRESS NOTES
Problem: Pain  Goal: *Control of Pain  Outcome: Progressing Towards Goal  Goal: *PALLIATIVE CARE:  Alleviation of Pain  Outcome: Progressing Towards Goal     Problem: Patient Education: Go to Patient Education Activity  Goal: Patient/Family Education  Outcome: Progressing Towards Goal     Problem: Vaginal Delivery: Discharge Outcomes  Goal: *Verbalizes name, dosage, time, side effects, and number of days to continue medications  Outcome: Progressing Towards Goal  Goal: *Describes available resources and support systems  Outcome: Progressing Towards Goal  Goal: *No signs and symptoms of infection  Outcome: Progressing Towards Goal  Goal: *Birth certificate information completed  Outcome: Progressing Towards Goal  Goal: *Received and verbalizes understanding of discharge plan and instructions  Outcome: Progressing Towards Goal  Goal: *Vital signs within defined limits  Outcome: Progressing Towards Goal  Goal: *Labs within defined limits  Outcome: Progressing Towards Goal  Goal: *Hemodynamically stable  Outcome: Progressing Towards Goal  Goal: *Optimal pain control at patient's stated goal  Outcome: Progressing Towards Goal  Goal: *Participates in infant care  Outcome: Progressing Towards Goal  Goal: *Demonstrates progressive activity  Outcome: Progressing Towards Goal  Goal: *Appropriate parent-infant bonding  Outcome: Progressing Towards Goal  Goal: *Tolerating diet  Outcome: Progressing Towards Goal

## 2020-03-03 NOTE — PROGRESS NOTES
0715- Bedside and Verbal shift change report given to Michael Zamora RN (oncoming nurse) by Heather Mcfarlane RN (offgoing nurse). Report included the following information SBAR, Intake/Output, MAR and Recent Results.

## 2020-03-03 NOTE — PROGRESS NOTES
1915- Bedside and Verbal shift change report given to Sera Barakat RN (oncoming nurse) by Deanne Dumont RN (offgoing nurse). Report included the following information SBAR, Intake/Output, MAR and Recent Results.

## 2020-03-03 NOTE — DISCHARGE SUMMARY
Obstetrical Discharge Summary     Name: Solange Morris MRN: [id number]  SSN: [id number]    YOB: 1995  Age: 25 y.o. Sex: female      Admit Date: 2020    Discharge Date: 3/3/2020    Admitting Physician: Lizy Collado MD     Attending Physician:  Darrian Daly MD     Discharge Diagnoses:   Information for the patient's :  [facility] [[id number]]   Delivery of a 6 lb 14.4 oz (3.13 kg) female infant via Vaginal, Spontaneous on 3/1/2020 at 12:17 PM  by Maricarmen Schulzshani. Apgars were 9  and 9 .        Additional Diagnoses:   Problem List as of 3/3/2020 Date Reviewed: 3/3/2020          Codes Class Noted - Resolved    Spontaneous vaginal delivery ICD-10-CM: O80  ICD-9-CM: 650  3/3/2020 - Present        IUP (intrauterine pregnancy), incidental ICD-10-CM: Z34.90  ICD-9-CM: V22.2  2020 - Present        RESOLVED: 40 weeks gestation of pregnancy ICD-10-CM: Z3A.40  ICD-9-CM: V22.2  3/1/2020 - 3/3/2020        RESOLVED: Uterine contractions during pregnancy ICD-10-CM: O62.2  ICD-9-CM: 661.20  3/1/2020 - 3/3/2020        RESOLVED: 39 weeks gestation of pregnancy ICD-10-CM: Z3A.39  ICD-9-CM: V22.2  2020 - 3/1/2020        RESOLVED: Abdominal cramping affecting pregnancy ICD-10-CM: O26.899, R10.9  ICD-9-CM: 646.80, 789.00  2020 - 3/1/2020        RESOLVED: Irregular contractions ICD-10-CM: O62.2  ICD-9-CM: 661.20  2020 - 3/1/2020        RESOLVED: GBS (group B Streptococcus carrier), +RV culture, currently pregnant ICD-10-CM: O99.820  ICD-9-CM: V23.89, V02.51  2020 - 3/3/2020        RESOLVED: Anemia in pregnancy, third trimester ICD-10-CM: O99.013  ICD-9-CM: 648.23  2019 - 3/3/2020    Overview Signed 2019  3:06 PM by Penelope Cazares, ESTEBAN     10.8 on 19 - started on ferrous sulfate once daily             RESOLVED: Urinary tract infection in mother during second trimester of pregnancy ICD-10-CM: O23.42  ICD-9-CM: 646.63, 599.0  10/21/2019 - 3/1/2020    Overview Addendum 1/8/2020  8:10 PM by Lindsay Camp NP     TWIN @ Nov OB visit. - neg in Dec 2019  Abnormal urine culture 1/6/20 - TWIN at next OB visit. * (Principal) RESOLVED: High-risk pregnancy in third trimester ICD-10-CM: O09.93  ICD-9-CM: V23.9  10/15/2019 - 3/3/2020        RESOLVED: Obesity affecting pregnancy in third trimester ICD-10-CM: O99.213  ICD-9-CM: 649.13  9/27/2019 - 3/3/2020    Overview Addendum 10/15/2019  9:59 AM by Jovana Alarcon MD     Early 1 hr GTT normal.  NST's biweekly at 37 wks  Female, EFW 42% at morph                   Lab Results   Component Value Date/Time    Rubella, External Immune 09/25/2019    GrBStrep, External positive 02/04/2020     Recent Labs     03/02/20  0419   [facility] course following the delivery. Patient Instructions:   Current Discharge Medication List      START taking these medications    Details   ibuprofen (MOTRIN) 800 mg tablet Take 1 Tab by mouth every eight (8) hours as needed for Pain. Indications: pain  Qty: 30 Tab, Refills: 1         CONTINUE these medications which have CHANGED    Details   ferrous sulfate (IRON) 325 mg (65 mg iron) EC tablet Take 1 Tab by mouth every other day. Qty: 90 Tab, Refills: 0    Associated Diagnoses: Anemia in pregnancy, third trimester         CONTINUE these medications which have NOT CHANGED    Details   terconazole (TERAZOL 7) 0.4 % vaginal cream Insert 1 Applicator into vagina nightly. Qty: 45 g, Refills: 0      prenatal vit-calcium-iron-fa (PRENATAL PLUS, CALCIUM CARB,) 27 mg iron- 1 mg tab Take 1 Tab by mouth daily. loratadine (CLARITIN) 10 mg tablet Take 1 Tab by mouth daily.   Qty: 30 Tab, Refills: 1    Associated Diagnoses: Congestion of upper respiratory tract         STOP taking these medications       ondansetron (ZOFRAN ODT) 8 mg disintegrating tablet Comments:   Reason for Stopping:         multivitamin (ONE A DAY) tablet Comments:   Reason for Stopping: Reference my discharge instructions.     Follow-up Appointments   Procedures    FOLLOW UP VISIT Appointment in: 6 Weeks     Standing Status:   Standing     Number of Occurrences:   1     Order Specific Question:   Appointment in     Answer:   6 Weeks        Signed By:  Vic Guevara MD     March 3, 2020                       BST

## 2020-03-03 NOTE — PROGRESS NOTES
2020- Assessment performed without complications. VSS. Pt in no apparent distress or discomfort. 2130- Rounded on pt, laying comfortably in bed holding infant. No needs expressed at this time. Bed in lowest position, call bell in reach. 2305- Rounded on pt, laying comfortably in bed. Pt requested more ice water. Pt in no apparent distress or discomfort. 0015- Rounded on pt, laying comfortably in bed watching television. No needs expressed at this time. Bed in lowest position, call bell in reach. 0105- Rounded on pt, laying comfortably in bed watching television. Pt denies pain or discomfort. No needs expressed at this time. 0315- Rounded on pt, currently in the bathroom. 0335- Rounded on pt, sitting comfortably on bed. Pt requested more ice water. Pt denies pain or discomfort. 0530- Rounded on pt, laying comfortably in bed skin to skin with infant. No needs expressed at this time. Bed in lowest position, call bell in reach.

## 2021-01-12 PROBLEM — O09.32 LATE PRENATAL CARE IN SECOND TRIMESTER: Status: ACTIVE | Noted: 2021-01-12

## 2021-01-12 PROBLEM — O09.892 SHORT INTERVAL BETWEEN PREGNANCIES AFFECTING PREGNANCY IN SECOND TRIMESTER, ANTEPARTUM: Status: ACTIVE | Noted: 2021-01-12

## 2021-01-12 PROBLEM — O09.92 SUPERVISION OF HIGH-RISK PREGNANCY, SECOND TRIMESTER: Status: ACTIVE | Noted: 2021-01-12

## 2021-01-12 PROBLEM — O99.212 OBESITY AFFECTING PREGNANCY IN SECOND TRIMESTER: Status: ACTIVE | Noted: 2021-01-12

## 2021-01-18 PROBLEM — O23.42 UTI IN PREGNANCY, SECOND TRIMESTER: Status: ACTIVE | Noted: 2021-01-18

## 2021-03-24 ENCOUNTER — ANESTHESIA (OUTPATIENT)
Dept: LABOR AND DELIVERY | Age: 26
DRG: 560 | End: 2021-03-24
Payer: MEDICAID

## 2021-03-24 ENCOUNTER — HOSPITAL ENCOUNTER (INPATIENT)
Age: 26
LOS: 1 days | Discharge: HOME | DRG: 560 | End: 2021-03-24
Attending: OBSTETRICS & GYNECOLOGY | Admitting: OBSTETRICS & GYNECOLOGY
Payer: MEDICAID

## 2021-03-24 ENCOUNTER — ANESTHESIA EVENT (OUTPATIENT)
Dept: LABOR AND DELIVERY | Age: 26
DRG: 560 | End: 2021-03-24
Payer: MEDICAID

## 2021-03-24 VITALS
HEIGHT: 66 IN | TEMPERATURE: 98.3 F | BODY MASS INDEX: 35.84 KG/M2 | SYSTOLIC BLOOD PRESSURE: 115 MMHG | WEIGHT: 223 LBS | OXYGEN SATURATION: 100 % | HEART RATE: 106 BPM | RESPIRATION RATE: 16 BRPM | DIASTOLIC BLOOD PRESSURE: 54 MMHG

## 2021-03-24 PROBLEM — O60.02 PRETERM LABOR IN SECOND TRIMESTER: Status: RESOLVED | Noted: 2021-03-24 | Resolved: 2021-03-24

## 2021-03-24 PROBLEM — O42.919 PRETERM PREMATURE RUPTURE OF MEMBRANES (PPROM) DELIVERED, CURRENT HOSPITALIZATION: Status: ACTIVE | Noted: 2021-03-24

## 2021-03-24 PROBLEM — O60.00 PRETERM LABOR: Status: RESOLVED | Noted: 2021-03-24 | Resolved: 2021-03-24

## 2021-03-24 PROBLEM — O09.32 LATE PRENATAL CARE IN SECOND TRIMESTER: Status: RESOLVED | Noted: 2021-01-12 | Resolved: 2021-03-24

## 2021-03-24 PROBLEM — O09.92 SUPERVISION OF HIGH RISK PREGNANCY, ANTEPARTUM, SECOND TRIMESTER: Status: RESOLVED | Noted: 2021-01-12 | Resolved: 2021-03-24

## 2021-03-24 PROBLEM — Z33.1 IUP (INTRAUTERINE PREGNANCY), INCIDENTAL: Status: RESOLVED | Noted: 2020-02-26 | Resolved: 2021-03-24

## 2021-03-24 PROBLEM — O60.00 PRETERM LABOR: Status: ACTIVE | Noted: 2021-03-24

## 2021-03-24 PROBLEM — O60.02 PRETERM LABOR IN SECOND TRIMESTER: Status: ACTIVE | Noted: 2021-03-24

## 2021-03-24 PROBLEM — O23.42 UTI IN PREGNANCY, SECOND TRIMESTER: Status: RESOLVED | Noted: 2021-01-18 | Resolved: 2021-03-24

## 2021-03-24 PROBLEM — O42.919 PRETERM PREMATURE RUPTURE OF MEMBRANES (PPROM) DELIVERED, CURRENT HOSPITALIZATION: Status: RESOLVED | Noted: 2021-03-24 | Resolved: 2021-03-24

## 2021-03-24 PROBLEM — O99.212 OBESITY AFFECTING PREGNANCY IN SECOND TRIMESTER: Status: RESOLVED | Noted: 2021-01-12 | Resolved: 2021-03-24

## 2021-03-24 PROBLEM — O09.892 SHORT INTERVAL BETWEEN PREGNANCIES AFFECTING PREGNANCY IN SECOND TRIMESTER, ANTEPARTUM: Status: RESOLVED | Noted: 2021-01-12 | Resolved: 2021-03-24

## 2021-03-24 LAB
A1 MICROGLOB PLACENTAL VAG QL: NEGATIVE
A1 MICROGLOB PLACENTAL VAG QL: POSITIVE
ABO + RH BLD: NORMAL
AMPHET UR QL SCN: NEGATIVE
BARBITURATES UR QL SCN: NEGATIVE
BASOPHILS # BLD: 0 K/UL (ref 0–0.1)
BASOPHILS # BLD: 0 K/UL (ref 0–0.1)
BASOPHILS NFR BLD: 0 % (ref 0–2)
BASOPHILS NFR BLD: 0 % (ref 0–2)
BENZODIAZ UR QL: NEGATIVE
BLOOD GROUP ANTIBODIES SERPL: NORMAL
CANNABINOIDS UR QL SCN: NEGATIVE
COCAINE UR QL SCN: NEGATIVE
CONTROL LINE PRESENT?: NORMAL
CONTROL LINE PRESENT?: NORMAL
DIFFERENTIAL METHOD BLD: ABNORMAL
DIFFERENTIAL METHOD BLD: ABNORMAL
EOSINOPHIL # BLD: 0 K/UL (ref 0–0.4)
EOSINOPHIL # BLD: 0.1 K/UL (ref 0–0.4)
EOSINOPHIL NFR BLD: 0 % (ref 0–5)
EOSINOPHIL NFR BLD: 1 % (ref 0–5)
ERYTHROCYTE [DISTWIDTH] IN BLOOD BY AUTOMATED COUNT: 13.5 % (ref 11.6–14.5)
ERYTHROCYTE [DISTWIDTH] IN BLOOD BY AUTOMATED COUNT: 13.5 % (ref 11.6–14.5)
EXPIRATION DATE: NORMAL
EXPIRATION DATE: NORMAL
HCT VFR BLD AUTO: 34.8 % (ref 35–45)
HCT VFR BLD AUTO: 36.6 % (ref 35–45)
HDSCOM,HDSCOM: NORMAL
HGB BLD-MCNC: 11.1 G/DL (ref 12–16)
HGB BLD-MCNC: 11.8 G/DL (ref 12–16)
INTERNAL NEGATIVE CONTROL: NORMAL
INTERNAL NEGATIVE CONTROL: NORMAL
KIT LOT NO.: NORMAL
KIT LOT NO.: NORMAL
LYMPHOCYTES # BLD: 0.5 K/UL (ref 0.9–3.6)
LYMPHOCYTES # BLD: 1.3 K/UL (ref 0.9–3.6)
LYMPHOCYTES NFR BLD: 13 % (ref 21–52)
LYMPHOCYTES NFR BLD: 4 % (ref 21–52)
MCH RBC QN AUTO: 29.5 PG (ref 24–34)
MCH RBC QN AUTO: 29.9 PG (ref 24–34)
MCHC RBC AUTO-ENTMCNC: 31.9 G/DL (ref 31–37)
MCHC RBC AUTO-ENTMCNC: 32.2 G/DL (ref 31–37)
MCV RBC AUTO: 92.6 FL (ref 74–97)
MCV RBC AUTO: 92.7 FL (ref 74–97)
METHADONE UR QL: NEGATIVE
MONOCYTES # BLD: 0.5 K/UL (ref 0.05–1.2)
MONOCYTES # BLD: 1 K/UL (ref 0.05–1.2)
MONOCYTES NFR BLD: 10 % (ref 3–10)
MONOCYTES NFR BLD: 3 % (ref 3–10)
NEUTS SEG # BLD: 13.2 K/UL (ref 1.8–8)
NEUTS SEG # BLD: 7.7 K/UL (ref 1.8–8)
NEUTS SEG NFR BLD: 76 % (ref 40–73)
NEUTS SEG NFR BLD: 93 % (ref 40–73)
OPIATES UR QL: NEGATIVE
PCP UR QL: NEGATIVE
PLATELET # BLD AUTO: 128 K/UL (ref 135–420)
PLATELET # BLD AUTO: 151 K/UL (ref 135–420)
PMV BLD AUTO: 12.1 FL (ref 9.2–11.8)
PMV BLD AUTO: 12.3 FL (ref 9.2–11.8)
RBC # BLD AUTO: 3.76 M/UL (ref 4.2–5.3)
RBC # BLD AUTO: 3.95 M/UL (ref 4.2–5.3)
SPECIMEN EXP DATE BLD: NORMAL
WBC # BLD AUTO: 10.2 K/UL (ref 4.6–13.2)
WBC # BLD AUTO: 14.2 K/UL (ref 4.6–13.2)

## 2021-03-24 PROCEDURE — 75410000000 HC DELIVERY VAGINAL/SINGLE

## 2021-03-24 PROCEDURE — 36415 COLL VENOUS BLD VENIPUNCTURE: CPT

## 2021-03-24 PROCEDURE — 74011250636 HC RX REV CODE- 250/636: Performed by: ANESTHESIOLOGY

## 2021-03-24 PROCEDURE — 80307 DRUG TEST PRSMV CHEM ANLYZR: CPT

## 2021-03-24 PROCEDURE — 75410000003 HC RECOV DEL/VAG/CSECN EA 0.5 HR

## 2021-03-24 PROCEDURE — 86901 BLOOD TYPING SEROLOGIC RH(D): CPT

## 2021-03-24 PROCEDURE — 88305 TISSUE EXAM BY PATHOLOGIST: CPT

## 2021-03-24 PROCEDURE — 74011000250 HC RX REV CODE- 250

## 2021-03-24 PROCEDURE — 74011250637 HC RX REV CODE- 250/637: Performed by: OBSTETRICS & GYNECOLOGY

## 2021-03-24 PROCEDURE — 74011250636 HC RX REV CODE- 250/636: Performed by: OBSTETRICS & GYNECOLOGY

## 2021-03-24 PROCEDURE — 75410000002 HC LABOR FEE PER 1 HR

## 2021-03-24 PROCEDURE — 00HU33Z INSERTION OF INFUSION DEVICE INTO SPINAL CANAL, PERCUTANEOUS APPROACH: ICD-10-PCS | Performed by: ANESTHESIOLOGY

## 2021-03-24 PROCEDURE — 76060000078 HC EPIDURAL ANESTHESIA

## 2021-03-24 PROCEDURE — 85025 COMPLETE CBC W/AUTO DIFF WBC: CPT

## 2021-03-24 PROCEDURE — 84112 EVAL AMNIOTIC FLUID PROTEIN: CPT | Performed by: STUDENT IN AN ORGANIZED HEALTH CARE EDUCATION/TRAINING PROGRAM

## 2021-03-24 PROCEDURE — 65270000029 HC RM PRIVATE

## 2021-03-24 PROCEDURE — 77030040830 HC CATH URETH FOL MDII -A

## 2021-03-24 PROCEDURE — 3E0R3BZ INTRODUCTION OF ANESTHETIC AGENT INTO SPINAL CANAL, PERCUTANEOUS APPROACH: ICD-10-PCS | Performed by: ANESTHESIOLOGY

## 2021-03-24 PROCEDURE — 84112 EVAL AMNIOTIC FLUID PROTEIN: CPT | Performed by: OBSTETRICS & GYNECOLOGY

## 2021-03-24 PROCEDURE — 74011000250 HC RX REV CODE- 250: Performed by: ANESTHESIOLOGY

## 2021-03-24 RX ORDER — LIDOCAINE HYDROCHLORIDE AND EPINEPHRINE 15; 5 MG/ML; UG/ML
INJECTION, SOLUTION EPIDURAL
Status: SHIPPED | OUTPATIENT
Start: 2021-03-24 | End: 2021-03-24

## 2021-03-24 RX ORDER — FENTANYL/ROPIVACAINE/NS/PF 2MCG/ML-.1
1-15 PLASTIC BAG, INJECTION (ML) EPIDURAL
Status: DISCONTINUED | OUTPATIENT
Start: 2021-03-24 | End: 2021-03-24 | Stop reason: HOSPADM

## 2021-03-24 RX ORDER — SODIUM CHLORIDE 0.9 % (FLUSH) 0.9 %
5-40 SYRINGE (ML) INJECTION AS NEEDED
Status: DISCONTINUED | OUTPATIENT
Start: 2021-03-24 | End: 2021-03-24 | Stop reason: HOSPADM

## 2021-03-24 RX ORDER — MAGNESIUM SULFATE HEPTAHYDRATE 40 MG/ML
2 INJECTION, SOLUTION INTRAVENOUS ONCE
Status: DISCONTINUED | OUTPATIENT
Start: 2021-03-24 | End: 2021-03-24

## 2021-03-24 RX ORDER — OXYTOCIN/0.9 % SODIUM CHLORIDE 30/500 ML
87.3 PLASTIC BAG, INJECTION (ML) INTRAVENOUS AS NEEDED
Status: DISCONTINUED | OUTPATIENT
Start: 2021-03-24 | End: 2021-03-24 | Stop reason: HOSPADM

## 2021-03-24 RX ORDER — PROMETHAZINE HYDROCHLORIDE 25 MG/ML
25 INJECTION, SOLUTION INTRAMUSCULAR; INTRAVENOUS
Status: CANCELLED | OUTPATIENT
Start: 2021-03-24

## 2021-03-24 RX ORDER — IBUPROFEN 400 MG/1
800 TABLET ORAL
Status: DISCONTINUED | OUTPATIENT
Start: 2021-03-24 | End: 2021-03-24 | Stop reason: HOSPADM

## 2021-03-24 RX ORDER — OXYTOCIN/0.9 % SODIUM CHLORIDE 30/500 ML
PLASTIC BAG, INJECTION (ML) INTRAVENOUS
Status: DISCONTINUED
Start: 2021-03-24 | End: 2021-03-24 | Stop reason: HOSPADM

## 2021-03-24 RX ORDER — IBUPROFEN 800 MG/1
800 TABLET ORAL
Qty: 30 TAB | Refills: 1 | Status: SHIPPED | OUTPATIENT
Start: 2021-03-24 | End: 2021-04-16

## 2021-03-24 RX ORDER — PHENYLEPHRINE HCL IN 0.9% NACL 1 MG/10 ML
80 SYRINGE (ML) INTRAVENOUS AS NEEDED
Status: COMPLETED | OUTPATIENT
Start: 2021-03-24 | End: 2021-03-24

## 2021-03-24 RX ORDER — SODIUM CHLORIDE 0.9 % (FLUSH) 0.9 %
5-40 SYRINGE (ML) INJECTION EVERY 8 HOURS
Status: DISCONTINUED | OUTPATIENT
Start: 2021-03-24 | End: 2021-03-24 | Stop reason: HOSPADM

## 2021-03-24 RX ORDER — ZOLPIDEM TARTRATE 5 MG/1
5 TABLET ORAL
Status: CANCELLED | OUTPATIENT
Start: 2021-03-24

## 2021-03-24 RX ORDER — AMOXICILLIN 250 MG
1 CAPSULE ORAL
Status: CANCELLED | OUTPATIENT
Start: 2021-03-24

## 2021-03-24 RX ORDER — FENTANYL/ROPIVACAINE/NS/PF 2MCG/ML-.1
PLASTIC BAG, INJECTION (ML) EPIDURAL
Status: COMPLETED
Start: 2021-03-24 | End: 2021-03-24

## 2021-03-24 RX ORDER — PHENYLEPHRINE HCL IN 0.9% NACL 1 MG/10 ML
80 SYRINGE (ML) INTRAVENOUS ONCE
Status: COMPLETED | OUTPATIENT
Start: 2021-03-24 | End: 2021-03-24

## 2021-03-24 RX ORDER — ACETAMINOPHEN 325 MG/1
650 TABLET ORAL
Status: CANCELLED | OUTPATIENT
Start: 2021-03-24

## 2021-03-24 RX ORDER — MAGNESIUM SULFATE HEPTAHYDRATE 40 MG/ML
2 INJECTION, SOLUTION INTRAVENOUS CONTINUOUS
Status: DISCONTINUED | OUTPATIENT
Start: 2021-03-24 | End: 2021-03-24 | Stop reason: HOSPADM

## 2021-03-24 RX ORDER — MAGNESIUM SULFATE HEPTAHYDRATE 40 MG/ML
2 INJECTION, SOLUTION INTRAVENOUS ONCE
Status: COMPLETED | OUTPATIENT
Start: 2021-03-24 | End: 2021-03-24

## 2021-03-24 RX ORDER — NALOXONE HYDROCHLORIDE 0.4 MG/ML
0.2 INJECTION, SOLUTION INTRAMUSCULAR; INTRAVENOUS; SUBCUTANEOUS AS NEEDED
Status: DISCONTINUED | OUTPATIENT
Start: 2021-03-24 | End: 2021-03-24 | Stop reason: HOSPADM

## 2021-03-24 RX ORDER — BETAMETHASONE SODIUM PHOSPHATE AND BETAMETHASONE ACETATE 3; 3 MG/ML; MG/ML
12 INJECTION, SUSPENSION INTRA-ARTICULAR; INTRALESIONAL; INTRAMUSCULAR; SOFT TISSUE ONCE
Status: COMPLETED | OUTPATIENT
Start: 2021-03-24 | End: 2021-03-24

## 2021-03-24 RX ORDER — OXYCODONE AND ACETAMINOPHEN 5; 325 MG/1; MG/1
2 TABLET ORAL
Status: CANCELLED | OUTPATIENT
Start: 2021-03-24

## 2021-03-24 RX ORDER — AZITHROMYCIN 250 MG/1
1000 TABLET, FILM COATED ORAL
Status: COMPLETED | OUTPATIENT
Start: 2021-03-24 | End: 2021-03-24

## 2021-03-24 RX ORDER — MINERAL OIL
OIL (ML) ORAL
Status: DISCONTINUED
Start: 2021-03-24 | End: 2021-03-24 | Stop reason: HOSPADM

## 2021-03-24 RX ORDER — OXYTOCIN/RINGER'S LACTATE 30/500 ML
10 PLASTIC BAG, INJECTION (ML) INTRAVENOUS AS NEEDED
Status: DISCONTINUED | OUTPATIENT
Start: 2021-03-24 | End: 2021-03-24 | Stop reason: HOSPADM

## 2021-03-24 RX ORDER — LIDOCAINE HYDROCHLORIDE 10 MG/ML
INJECTION, SOLUTION EPIDURAL; INFILTRATION; INTRACAUDAL; PERINEURAL
Status: DISCONTINUED
Start: 2021-03-24 | End: 2021-03-24 | Stop reason: HOSPADM

## 2021-03-24 RX ORDER — FENTANYL CITRATE 50 UG/ML
INJECTION, SOLUTION INTRAMUSCULAR; INTRAVENOUS
Status: DISCONTINUED
Start: 2021-03-24 | End: 2021-03-24 | Stop reason: WASHOUT

## 2021-03-24 RX ORDER — LIDOCAINE HYDROCHLORIDE AND EPINEPHRINE 20; 5 MG/ML; UG/ML
INJECTION, SOLUTION EPIDURAL; INFILTRATION; INTRACAUDAL; PERINEURAL AS NEEDED
Status: DISCONTINUED | OUTPATIENT
Start: 2021-03-24 | End: 2021-03-24 | Stop reason: HOSPADM

## 2021-03-24 RX ORDER — MAGNESIUM SULFATE HEPTAHYDRATE 40 MG/ML
4 INJECTION, SOLUTION INTRAVENOUS ONCE
Status: DISCONTINUED | OUTPATIENT
Start: 2021-03-24 | End: 2021-03-24 | Stop reason: ALTCHOICE

## 2021-03-24 RX ADMIN — MAGNESIUM SULFATE HEPTAHYDRATE 2 G: 40 INJECTION, SOLUTION INTRAVENOUS at 07:40

## 2021-03-24 RX ADMIN — SODIUM CHLORIDE, SODIUM LACTATE, POTASSIUM CHLORIDE, AND CALCIUM CHLORIDE 300 ML: 600; 310; 30; 20 INJECTION, SOLUTION INTRAVENOUS at 08:44

## 2021-03-24 RX ADMIN — Medication 12 ML/HR: at 08:29

## 2021-03-24 RX ADMIN — MAGNESIUM SULFATE HEPTAHYDRATE 2 G/HR: 40 INJECTION, SOLUTION INTRAVENOUS at 07:46

## 2021-03-24 RX ADMIN — MAGNESIUM SULFATE HEPTAHYDRATE 2 G/HR: 40 INJECTION, SOLUTION INTRAVENOUS at 07:23

## 2021-03-24 RX ADMIN — MAGNESIUM SULFATE 4 G: 4 INJECTION INTRAVENOUS at 07:03

## 2021-03-24 RX ADMIN — LIDOCAINE HYDROCHLORIDE,EPINEPHRINE BITARTRATE 4 ML: 15; .005 INJECTION, SOLUTION EPIDURAL; INFILTRATION; INTRACAUDAL; PERINEURAL at 08:18

## 2021-03-24 RX ADMIN — Medication 80 MCG: at 08:46

## 2021-03-24 RX ADMIN — AZITHROMYCIN MONOHYDRATE 1000 MG: 250 TABLET ORAL at 07:43

## 2021-03-24 RX ADMIN — LIDOCAINE HYDROCHLORIDE,EPINEPHRINE BITARTRATE 7 ML: 20; .005 INJECTION, SOLUTION EPIDURAL; INFILTRATION; INTRACAUDAL; PERINEURAL at 08:28

## 2021-03-24 RX ADMIN — BETAMETHASONE SODIUM PHOSPHATE AND BETAMETHASONE ACETATE 12 MG: 3; 3 INJECTION, SUSPENSION INTRA-ARTICULAR; INTRALESIONAL; INTRAMUSCULAR at 07:02

## 2021-03-24 RX ADMIN — Medication 80 MCG: at 08:58

## 2021-03-24 NOTE — PROGRESS NOTES
Problem: Patient Education: Go to Patient Education Activity  Goal: Patient/Family Education  Outcome: Progressing Towards Goal     Problem: Vaginal Delivery: Day of Deliver-Laboring  Goal: Off Pathway (Use only if patient is Off Pathway)  Outcome: Progressing Towards Goal  Goal: Activity/Safety  Outcome: Progressing Towards Goal  Goal: Consults, if ordered  Outcome: Progressing Towards Goal  Goal: Diagnostic Test/Procedures  Outcome: Progressing Towards Goal  Goal: Nutrition/Diet  Outcome: Progressing Towards Goal  Goal: Discharge Planning  Outcome: Progressing Towards Goal  Goal: Medications  Outcome: Progressing Towards Goal  Goal: Respiratory  Outcome: Progressing Towards Goal  Goal: Treatments/Interventions/Procedures  Outcome: Progressing Towards Goal  Goal: *Vital signs within defined limits  Outcome: Progressing Towards Goal  Goal: *Labs within defined limits  Outcome: Progressing Towards Goal  Goal: *Hemodynamically stable  Outcome: Progressing Towards Goal  Goal: *Optimal pain control at patient's stated goal  Outcome: Progressing Towards Goal     Problem: Pain  Goal: *Control of Pain  Outcome: Progressing Towards Goal     Problem: Patient Education: Go to Patient Education Activity  Goal: Patient/Family Education  Outcome: Progressing Towards Goal     Problem:  Labor  Goal: *Prevention/Cessation of labor signs/symptoms  Outcome: Progressing Towards Goal  Goal: *Reassuring fetal surveillance  Outcome: Progressing Towards Goal     Problem: Patient Education: Go to Patient Education Activity  Goal: Patient/Family Education  Outcome: Progressing Towards Goal

## 2021-03-24 NOTE — ANESTHESIA PROCEDURE NOTES
Epidural Block    Patient location during procedure: OB  Start time: 3/24/2021 8:18 AM  End time: 3/24/2021 8:34 AM  Reason for block: labor epidural  Staffing  Performed: attending   Anesthesiologist: Katherine Martin MD  Preanesthetic Checklist  Completed: patient identified, IV checked, risks and benefits discussed, surgical consent, monitors and equipment checked, pre-op evaluation and timeout performed  Block Placement  Patient position: sitting  Prep: ChloraPrep  Sterility prep: cap, drape, gloves, hand and mask  Sedation level: no sedation  Patient monitoring: heart rate, frequent blood pressure checks, ETCO2 and continuous pulse oximetry  Approach: midline  Location: lumbar  Lumbar location: L3-L4  Epidural  Loss of resistance technique: saline  Guidance: landmark technique  Needle  Needle type: Tuohy   Needle gauge: 17 G  Needle length: 9 cm  Needle insertion depth: 8 cm  Catheter type: multi-orifice  Catheter size: 19 G  Catheter at skin depth: 12 cm  Catheter securement method: surgical tape and clear occlusive dressing  Test dose: negative  Medications Administered  Lidocaine-EPINEPHrine (XYLOCAINE) 1.5 %-1:200,000 Epidural, 4 mL  Assessment  Sensory level: T10  Block outcome: pain improved  Number of attempts: 1  Procedure assessment: patient tolerated procedure well with no complications

## 2021-03-24 NOTE — PROGRESS NOTES
56 Pt is a 22 y.o.  at 26w5d, arrived to L&D triage with c/o of irregular uterine contractions all day yesterday, increased in frequency and duration during the night. Felt a \"gush of fluid\" about 0500. States positive fetal movement. States small amount of pink tinged discharge after fluid. Is not wearing a pad. EFM and TOCO applied, call bell within reach. 0630 Amnisure and SVE done. 5522 Dr. Frank Garvey in [address] [place] [address]. Notified of pt arrival, SBAR report given re c/o, SVE, and negative amnisure. Orders received. Will ask Dr. Darion Garrett to verify fetal position. 9038 Paged Dr. Chen Garcia through [address] [place] [address] and call immediately returned. SBAR report given re pt c/o, SVE, and negative amnisure. Orders received. 7855 Bedside ultrasound per Dr. Darion Garrett. Fetus in vertex position. 1749 Transferred to [facility] via bed for admission. 9760 Dr. Chen Garcia to bedside. 0725 Pt states \"Something is leaking\". Clear fluid noted on the perineum. Dr. Chen Garcia on unit. SBAR report given re c/o leaking fluid. Orders received. 0730 Amnisure done. 0529 Dr. Chen Garcia remains on unit. Notified of positive amnisure. 18 Dr. Chen Garcia to bedside. SVE done. 2791 Dr. Chen Garcia to bedside. Dr. Gerry Richards paged and call returned. Notified of pt request for epidural.    0750 Dr. Erik Lin to bedside for consult. 8001  Anesthesia at bedside    0817 in postion for epidural    0818: epidural TO    0822 Procedure begins    0823: epidural needle placed     0825: epidural catheter placed, needle removed    0827 Test Dose    0828: epidural loading Dose admin. Procedure is complete. Taping epidural catheter in place    4755 Dr. Chen Garcia to bedside. SVE done. 0846 Epidural turned off.    0859 See delivery record. 0900 Bedside and Verbal shift change report given and care relinquished to ISABELA Mireles RN (oncoming nurse) by Dipika Hirsch RN (offgoing nurse).  Report included the following information SBAR, Kardex, Procedure Summary, Intake/Output, MAR and Recent Results.

## 2021-03-24 NOTE — PROGRESS NOTES
Post-Partum Day Number 0 Progress Note    Amy Koch     Assessment:   Hospital Problems  Date Reviewed: 3/24/2021          Codes Class Noted POA    * (Principal)  labor in second trimester ICD-10-CM: O60.02  ICD-9-CM: 644.20  3/24/2021 Yes         labor ICD-10-CM: O60.00  ICD-9-CM: 644.00  3/24/2021 Unknown         premature rupture of membranes (PPROM) delivered, current hospitalization ICD-10-CM: O42.919  ICD-9-CM: 658.11  3/24/2021 No        Short interval between pregnancies affecting pregnancy in second trimester, antepartum ICD-10-CM: O09.892  ICD-9-CM: V23.89  2021 Yes        Late prenatal care in second trimester ICD-10-CM: O09.32  ICD-9-CM: V23.7  2021 Yes        Spontaneous vaginal delivery ICD-10-CM: O80  ICD-9-CM: 245  3/3/2020 Yes        IUP (intrauterine pregnancy), incidental ICD-10-CM: Z33.1  ICD-9-CM: V22.2  2020 Yes            Doing well, post partum day 0    Plan:   1. Discharge home today  2. Follow up in office in 6 weeks with Tanisha Kapadia MD   3. Post partum activity advised, diet as tolerated  4. Discharge Medications: ibuprofen and medications prior to admission    Information for the patient's :  Aletha Garcia [[id number]]   Vaginal, Spontaneous    Patient doing well without significant complaint. Voiding without difficulty, normal lochia. Baby has been transferred to [facility] and patient would like to go to see the baby.      Current Facility-Administered Medications   Medication Dose Route Frequency    sodium chloride (NS) flush 5-40 mL  5-40 mL IntraVENous Q8H    magnesium sulfate 40 g/1000 mL Sterile Water infusion  2 g/hr IntraVENous CONTINUOUS    mineral oil        oxytocin in normal saline (PITOCIN) 30 unit/500 mL infusion        lidocaine (PF) (XYLOCAINE) 10 mg/mL (1 %) injection        lactated ringers bolus infusion 1,000 mL  1,000 mL IntraVENous ONCE    sodium chloride (NS) flush 5-40 mL  5-40 mL IntraVENous Q8H  fentaNYL 2 mcg/ml with ropivacaine 0.1 % (PF) epidural  1-15 mL/hr Epidural TITRATE       Vitals:  Visit Vitals  /81   Pulse 94   Temp 98.3 °F (36.8 °C)   Resp 16   Ht 5' 6\" (1.676 m)   Wt 223 lb (101.2 kg)   LMP 2020   SpO2 100%   Breastfeeding Unknown   BMI 35.99 kg/m²     Temp (24hrs), Av.6 °F (37 °C), Min:98.3 °F (36.8 °C), Max:98.9 °F (37.2 °C)      Exam:         Not performed. Labs:     Lab Results   Component Value Date/Time    WBC 14.2 (H) 2021 03:05 PM    WBC 10.2 2021 06:40 AM    WBC 7.3 2021 03:23 PM    HGB 11.1 (L) 2021 03:05 PM    HGB 11.8 (L) 2021 06:40 AM    HGB 12.5 2021 03:23 PM    HCT 34.8 (L) 2021 03:05 PM    HCT 36.6 2021 06:40 AM    HCT 40.2 2021 03:23 PM    PLATELET 939  03:05 PM    PLATELET 929 (L) 917 06:40 AM    PLATELET 688  03:23 PM    Hgb, External 11.9 2020    Hct, External 38 2020    Platelet cnt., External 159 2020       Recent Results (from the past 24 hour(s))   RUPTURE OF FETAL MEMBRANES, POC    Collection Time: 21  6:30 AM   Result Value Ref Range    Rupture of fetal membrane Negative Negative    Control line present? Acceptable     Internal negative control Acceptable     Kit Lot No. [id number]     Expiration date 23    CBC WITH AUTOMATED DIFF    Collection Time: 21  6:40 AM   Result Value Ref Range    WBC 10.2 4.6 - 13.2 K/uL    RBC 3.95 (L) 4.20 - 5.30 M/uL    HGB 11.8 (L) 12.0 - 16.0 g/dL    HCT 36.6 35.0 - 45.0 %    MCV 92.7 74.0 - 97.0 FL    MCH 29.9 24.0 - 34.0 PG    MCHC 32.2 31.0 - 37.0 g/dL    RDW 13.5 11.6 - 14.5 %    PLATELET 622 (L) 112 - 420 K/uL    MPV 12.3 (H) 9.2 - 11.8 FL    NEUTROPHILS 76 (H) 40 - 73 %    LYMPHOCYTES 13 (L) 21 - 52 %    MONOCYTES 10 3 - 10 %    EOSINOPHILS 1 0 - 5 %    BASOPHILS 0 0 - 2 %    ABS. NEUTROPHILS 7.7 1.8 - 8.0 K/UL    ABS. LYMPHOCYTES 1.3 0.9 - 3.6 K/UL    ABS. MONOCYTES 1.0 0.05 - 1.2 K/UL    ABS. EOSINOPHILS 0.1 0.0 - 0.4 K/UL    ABS. BASOPHILS 0.0 0.0 - 0.1 K/UL    DF AUTOMATED     TYPE & SCREEN    Collection Time: 03/24/21  6:40 AM   Result Value Ref Range    Crossmatch Expiration 03/27/2021,2359     ABO/Rh(D) A POSITIVE     Antibody screen NEG    RUPTURE OF FETAL MEMBRANES, POC    Collection Time: 03/24/21  7:25 AM   Result Value Ref Range    Rupture of fetal membrane Positive Negative    Control line present? Acceptable     Internal negative control Acceptable     Kit Lot No. [id number]     Expiration date 11-    DRUG SCREEN, URINE    Collection Time: 03/24/21  9:10 AM   Result Value Ref Range    BENZODIAZEPINES Negative NEG      BARBITURATES Negative NEG      THC (TH-CANNABINOL) Negative NEG      OPIATES Negative NEG      PCP(PHENCYCLIDINE) Negative NEG      COCAINE Negative NEG      AMPHETAMINES Negative NEG      METHADONE Negative NEG      HDSCOM (NOTE)    CBC WITH AUTOMATED DIFF    Collection Time: 03/24/21  3:05 PM   Result Value Ref Range    WBC 14.2 (H) 4.6 - 13.2 K/uL    RBC 3.76 (L) 4.20 - 5.30 M/uL    HGB 11.1 (L) 12.0 - 16.0 g/dL    HCT 34.8 (L) 35.0 - 45.0 %    MCV 92.6 74.0 - 97.0 FL    MCH 29.5 24.0 - 34.0 PG    MCHC 31.9 31.0 - 37.0 g/dL    RDW 13.5 11.6 - 14.5 %    PLATELET 416 918 - 385 K/uL    MPV 12.1 (H) 9.2 - 11.8 FL    NEUTROPHILS 93 (H) 40 - 73 %    LYMPHOCYTES 4 (L) 21 - 52 %    MONOCYTES 3 3 - 10 %    EOSINOPHILS 0 0 - 5 %    BASOPHILS 0 0 - 2 %    ABS. NEUTROPHILS 13.2 (H) 1.8 - 8.0 K/UL    ABS. LYMPHOCYTES 0.5 (L) 0.9 - 3.6 K/UL    ABS. MONOCYTES 0.5 0.05 - 1.2 K/UL    ABS. EOSINOPHILS 0.0 0.0 - 0.4 K/UL    ABS.  BASOPHILS 0.0 0.0 - 0.1 K/UL    DF AUTOMATED

## 2021-03-24 NOTE — PROGRESS NOTES
Labor Progress Note  Patient seen, fetal heart rate and contraction pattern evaluated, patient examined. Patient Vitals for the past 1 hrs:   BP Temp Pulse Resp SpO2 Height Weight   03/24/21 0720      5' 6\" (1.676 m) 223 lb (101.2 kg)   03/24/21 0703     100 %     03/24/21 0700 97/75  (!) 117       03/24/21 0658     100 %     03/24/21 0654 (!) 114/55 98.9 °F (37.2 °C) (!) 105 18      03/24/21 0653     100 %         Physical Exam:  Cervical Exam:  8 cm dilated    Membranes:  Spontaneous Rupture of Membranes;  Amniotic Fluid: clear fluid  Uterine Activity: Frequency: Every 2-3 minutes  Fetal Heart Rate: Baseline: 140's per minute    Assessment/Plan:  Continue plan for vaginal delivery

## 2021-03-24 NOTE — DISCHARGE INSTRUCTIONS
Discharge Instructions: Vaginal Delivery    Signs of Illness:  CALL YOUR PROVIDER IF ANY OF THE FOLLOWING OCCUR  1. Temperature of 100.4 or above  2. Chills  3. Severe abdominal pain  4. Excessive vaginal bleeding (more than 1 pad/hour)  5. Large amount of clots  6. Unusual discharge or drainage  7. Discharge with foul odor  8. Pain or burning on urination  9. Painful, reddened, tender breasts  10. Other symptoms you do not understand     Activity  1. Rest when your baby rests  2. 1-2 weeks after delivery, gradually increase your activity    General Concerns  1. Eat healthy, proper nutrition and adequate fluids are essential for healing, strength and energy. 2. Continue monthly self breast exams  3. No douching, tampons or intercourse for 6 weeks  4. No tub baths, pools, or hot tubs for 6 weeks  5. IF YOU ARE BREASTFEEDING: In the first week, when you experience extreme fullness engorgement) in your breasts, it may be difficult for your baby to latch on. Refer to A Time to Care booklet. Call your pediatrician if this lasts more than 2 days. Follow-up  Call you provider on the day of discharge to schedule a follow-up appointment in 6 weeks. Call [phone] if you have any questions, and ask to speak with a nurse. DISCHARGE SUMMARY from Nurse    The following personal items collected during your admission are returned to you:   Dental Appliance: Dental Appliances: None  Vision: Visual Aid: None  Hearing Aid:    Jewelry: Jewelry: None  Clothing: Clothing: Undergarments, Shirt, Socks, Slippers, Jacket/Coat, With patient, Pants  Other Valuables: Other Valuables: Cell Phone  Valuables sent to safe:      *  Please give a list of your current medications to your Primary Care Provider. *  Please update this list whenever your medications are discontinued, doses are      changed, or new medications (including over-the-counter products) are added.     *  Please carry medication information at all times in case of emergency situations. These are general instructions for a healthy lifestyle:    No smoking/ No tobacco products/ Avoid exposure to second hand smoke    Surgeon General's Warning:  Quitting smoking now greatly reduces serious risk to your health. Obesity, smoking, and sedentary lifestyle greatly increases your risk for illness    A healthy diet, regular physical exercise & weight monitoring are important for maintaining a healthy lifestyle    You may be retaining fluid if you have a history of heart failure or if you experience any of the following symptoms:  Weight gain of 3 pounds or more overnight or 5 pounds in a week, increased swelling in our hands or feet or shortness of breath while lying flat in bed. Please call your doctor as soon as you notice any of these symptoms; do not wait until your next office visit. Recognize signs and symptoms of STROKE:    F-face looks uneven    A-arms unable to move or move unevenly    S-speech slurred or non-existent    T-time-call 911 as soon as signs and symptoms begin-DO NOT go       Back to bed or wait to see if you get better-TIME IS BRAIN. The discharge information has been reviewed with the patient. The patient verbalized understanding. Patient armband removed and given to patient to take home. Patient was informed of the privacy risks if armband lost or stolen    Waynaut Activation    Thank you for requesting access to Waynaut. Please follow the instructions below to securely access and download your online medical record. Waynaut allows you to send messages to your doctor, view your test results, renew your prescriptions, schedule appointments, and more. How Do I Sign Up? 1. In your internet browser, go to [email or web address]. 2. Click on the First Time User? Click Here link in the Sign In box. You will see the New Member Sign Up page. 3. Enter your Waynaut Access Code exactly as it appears below.  You will not need to use this code after youve completed the sign-up process. If you do not sign up before the expiration date, you must request a new code. SquareHub Access Code: Activation code not generated  Current SquareHub Status: Active (This is the date your SquareHub access code will )    4. Enter the last four digits of your Social Security Number (xxxx) and Date of Birth (mm/dd/yyyy) as indicated and click Submit. You will be taken to the next sign-up page. 5. Create a Fashioholict ID. This will be your SquareHub login ID and cannot be changed, so think of one that is secure and easy to remember. 6. Create a SquareHub password. You can change your password at any time. 7. Enter your Password Reset Question and Answer. This can be used at a later time if you forget your password. 8. Enter your e-mail address. You will receive e-mail notification when new information is available in [address] 9. Click Sign Up. You can now view and download portions of your medical record. 10. Click the Download Summary menu link to download a portable copy of your medical information. Additional Information    If you have questions, please visit the Frequently Asked Questions section of the SquareHub website at [email or web address] Remember, SquareHub is NOT to be used for urgent needs. For medical emergencies, dial 911. After Your Delivery (the Postpartum Period): After Your Visit  Your Care Instructions  Congratulations on the birth of your baby. Like pregnancy, the  period can be a time of excitement, estela, and exhaustion. You may look at your wondrous little baby and feel happy. You may also be overwhelmed by your new sleep hours and new responsibilities. At first, babies often sleep during the days and are awake at night. They do not have a pattern or routine. They may make sudden gasps, jerk themselves awake, or look like they have crossed eyes.  These are all normal, and they may even make you smile.  In these first weeks after delivery, try to take good care of yourself. It may take 4 to 6 weeks to feel like yourself again, and possibly longer if you had a  birth. You will likely feel very tired for several weeks. Your days will be full of ups and downs, but lots of estela as well. Follow-up care is a key part of your treatment and safety. Be sure to make and go to all appointments, and call your doctor if you are having problems. Its also a good idea to know your test results and keep a list of the medicines you take. How can you care for yourself at home? Take care of your body after delivery  · Use pads instead of tampons for the bloody flow that may last as long as 2 weeks. · Ease cramps with acetaminophen (Tylenol). · Ease soreness of hemorrhoids and the area between your vagina and rectum with ice compresses or witch hazel pads. · Ease constipation by drinking lots of fluid and eating high-fiber foods. Ask your doctor about over-the-counter stool softeners. · Cleanse yourself with a gentle squeeze of warm water from a bottle instead of wiping with toilet paper. · Take a sitz bath in warm water several times a day. · Wear a good nursing bra. Ease sore and swollen breasts with warm, wet washcloths. · If you are not breast-feeding, use ice rather than heat for breast soreness. · Your period may not start for several months if you are breast-feeding. You may bleed more, and longer at first, than you did before you got pregnant. · Wait until you are healed (about 4 to 6 weeks) before you have sexual intercourse. Your doctor will tell you when it is okay to have sex. · Try not to travel with your baby for 5 or 6 weeks. If you take a long car trip, make frequent stops to walk around and stretch. Avoid exhaustion  · Rest every day. Try to nap when your baby naps. · Ask another adult to be with you for a few days after delivery. · Plan for  if you have other children.   · Stay flexible so you can eat at odd hours and sleep when you need to. Both you and your baby are making new schedules. · Plan small trips to get out of the house. Change can make you feel less tired. · Ask for help with housework, cooking, and shopping. Remind yourself that your job is to care for your baby. Know about help for postpartum depression  · \"Baby blues are common for the first 1 to 2 weeks after birth. You may cry or feel sad or irritable for no reason. · Rest whenever you can. Being tired makes it harder to handle your emotions. · Go for walks with your baby. · Talk to your partner, friends, and family about your feelings. · If your symptoms last for more than a few weeks, or if you feel very depressed, ask your doctor for help. · Postpartum depression can be treated. Support groups and counseling can help. Sometimes medicine can also help. Stay healthy  · Eat healthy foods so you have more energy, make good breast milk, and lose extra baby pounds. · If you breast-feed, avoid alcohol and drugs. Stay smoke-free. If you quit during pregnancy, congratulations. · Start daily exercise after 4 to 6 weeks, but rest when you feel tired. · Learn exercises to tone your belly. Do Kegel exercises to regain strength in your pelvic muscles. You can do these exercises while you stand or sit. ¨ Squeeze the same muscles you would use to stop your urine. Your belly and rear end (buttocks) should not move. ¨ Hold the squeeze for 3 seconds, then relax for 3 seconds. ¨ Repeat the exercise 10 to 15 times for each session. Do three or more sessions each day. · Find a class for new mothers and new babies that has an exercise time. · If you had a  birth, give yourself a bit more time before you exercise, and be careful. When should you call for help? Call 911 anytime you think you may need emergency care. For example, call if:  · You have sudden, severe pain in your belly.   · You passed out (lost consciousness). Call your doctor now or seek immediate medical care if:  · You have severe vaginal bleeding. You are passing blood clots and soaking through a pad each hour for 2 or more hours. · Your vaginal bleeding seems to be getting heavier or is still bright red 4 days after delivery, or you pass blood clots larger than the size of a golf ball. · You are dizzy or lightheaded, or you feel like you may faint. · You are vomiting or cannot keep fluids down. · You have a fever. · You have new or more belly pain. · You pass tissue (not just blood). · Your breast or breasts have hard, red, or tender areas. · You have an urgent or frequent need to urinate, along with a burning feeling. · You have severe pain, tenderness, or swelling in your vagina or the area between your rectum and vagina. · You have severe pains in your chest, belly, back, or legs. · You have feelings of severe despair or great anxiety. · Your baby is unusually cranky or is sleeping too much. · Your baby's eyes are red or have discharge. · Your baby has white patches on the roof and sides of the mouth or tongue. · Your baby's umbilical cord is foul-smelling, swollen, red, or leaking pus. · There is blood or mucus in your baby's bowel movements. · Your baby has fewer than 6 wet diapers a day. · Your baby does not want to eat, or your baby is throwing up with every feeding. · Your baby has trouble breathing. · Your baby has a rectal temperature of 100.4°F or more, or an underarm temperature over 99.4°F.  · You baby has a low temperature less than 97.5°F rectal, or less than 97. 0°F underarm. · Your baby's skin looks yellow. Watch closely for changes in your health, and be sure to contact your doctor if you have any problems. Where can you learn more? Go to [employer]be  Enter A461 in the search box to learn more about \"After Your Delivery (the Postpartum Period): After Your Visit. \"   © 6017-7225 Healthwise, Incorporated. Care instructions adapted under license by [facility] (which disclaims liability or warranty for this information). This care instruction is for use with your licensed healthcare professional. If you have questions about a medical condition or this instruction, always ask your healthcare professional. [address] any warranty or liability for your use of this information. Content Version: 9.3.88333; Last Revised: July 8, 2010      Depression After Childbirth: After Your Visit  Your Care Instructions  Many women get the \"baby blues\" during the first few days after childbirth. They may lose sleep, feel irritable, cry easily, and feel happy one minute and sad the next. Hormone changes are one cause of these emotional changes. Also, the demands of a new baby, coupled with visits from relatives or other family needs, add to a mother's stress. The \"baby blues\" usually peak around the fourth day and then ease up in less than 2 weeks. If your moodiness or anxiety lasts for more than 2 weeks, or if you feel like life is not worth living, you may have postpartum depression. This is different for each mother. Some mothers with serious depression may worry intensely about their infant's well-being, while others may feel distant from their child. Some mothers might even feel that they might harm their baby. A mother may have signs of paranoia, wondering if someone is watching her. Depression is not a sign of weakness. It is a medical condition that requires treatment. Medicine and counseling are often effective at reducing depression. Talk to your doctor about taking antidepressant medicine while breast-feeding. Follow-up care is a key part of your treatment and safety. Be sure to make and go to all appointments, and call your doctor if you are having problems. Its also a good idea to know your test results and keep a list of the medicines you take.   How can you care for yourself at home? · Take your medicines exactly as prescribed. Call your doctor if you think you are having a problem with your medicine. · Eat a balanced diet, so that you can keep up your energy. · Get regular daily exercise, such as walks, to help improve your mood. · Get as much sunlight as possible. Keep your shades and curtains open, and get outside as much as you can. · Avoid using alcohol or other substances to feel better. · Get as much rest and sleep as possible, and avoid doing too much. Being too tired can increase depression. · Play stimulating music throughout your day and soothing music at night. · Schedule outings and visits with friends and family. Ask them to call you regularly, so that you do not feel alone. · Ask for help with preparing food and other daily tasks. Family and friends are often happy to help a mother with a . · Be honest with yourself and those who care about you. Tell them about your struggle. · Join a support group of new mothers. No one can better understand the challenges of caring for a  than other new mothers. When should you call for help? Call 911 anytime you think you may need emergency care. For example, call if:  · You feel you cannot stop from hurting yourself or someone else. Call your doctor now or seek immediate medical care if:  · You are having trouble caring for yourself or your baby. · You have signs of paranoia that can occur with postpartum depression. You fear that someone is watching you, stealing from you, or reading your mind. · You hear voices. · You have symptoms of postpartum depression, such as:  ¨ Sleeplessness. ¨ Anxiety. ¨ Hopelessness. ¨ Irritability. ¨ Poor concentration. · Someone you know has depression and:  ¨ Starts to give away his or her possessions. ¨ Uses illegal drugs or drinks alcohol heavily.   ¨ Talks or writes about death, including writing suicide notes and talking about guns, knives, or pills.  ¨ Starts to spend a lot of time alone. ¨ Acts very aggressively or suddenly appears calm. Watch closely for changes in your health, and be sure to contact your doctor if you have any problems. Where can you learn more? Go to [email or web address].be  Enter C385 in the search box to learn more about \"Depression After Childbirth: After Your Visit. \"   © 4037-0697 Healthwise, Incorporated. Care instructions adapted under license by [employer] (which disclaims liability or warranty for this information). This care instruction is for use with your licensed healthcare professional. If you have questions about a medical condition or this instruction, always ask your healthcare professional. [place] [address] any warranty or liability for your use of this information. Content Version: 9.3.83169; Last Revised: April 18, 2011    Breast Self-Exam: After Your Visit  Your Care Instructions  A breast self-exam is when you check your breasts for lumps or changes. This regular exam helps you learn how your breasts normally look and feel. Most breast problems or changes are not because of cancer. Breast self-exam is not a substitute for a mammogram. Having regular breast exams by your doctor and regular mammograms improve your chances of finding any problems with your breasts. Some women set a time each month to do a step-by-step breast self-exam. Other women like a less formal system. They might look at their breasts as they brush their teeth, or feel their breasts once in a while in the shower. If you notice a change in your breast, tell your doctor. Follow-up care is a key part of your treatment and safety. Be sure to make and go to all appointments, and call your doctor if you are having problems. Its also a good idea to know your test results and keep a list of the medicines you take.   How do you do a breast self-exam?  · The best time to examine your breasts is usually one week after your menstrual period begins. Your breasts should not be tender then. If you do not have periods, you might do your exam on a day of the month that is easy to remember. · To examine your breasts:  ¨ Remove all your clothes above the waist and lie down. When you are lying down, your breast tissue spreads evenly over your chest wall, which makes it easier to feel all your breast tissue. ¨ Use the pads--not the fingertips--of the 3 middle fingers of your left hand to check your right breast. Move your fingers slowly in small coin-sized circles that overlap. ¨ Use three levels of pressure to feel of all your breast tissue. Use light pressure to feel the tissue close to the skin surface. Use medium pressure to feel a little deeper. Use firm pressure to feel your tissue close to your breastbone and ribs. Use each pressure level to feel your breast tissue before moving on to the next spot. ¨ Check your entire breast, moving up and down as if following a strip from the collarbone to the bra line, and from the armpit to the ribs. Repeat until you have covered the entire breast.  ¨ Repeat this procedure for your left breast, using the pads of the 3 middle fingers of your right hand. · To examine your breasts while in the shower:  ¨ Place one arm over your head and lightly soap your breast on that side. ¨ Using the pads of your fingers, gently move your hand over your breast (in the strip pattern described above), feeling carefully for any lumps or changes. ¨ Repeat for the other breast.  · Have your doctor inspect anything you notice to see if you need further testing. Where can you learn more? Go to [email or web address]  Enter P148 in the search box to learn more about \"Breast Self-Exam: After Your Visit. \"   © 4430-6942 Healthwise, Incorporated. Care instructions adapted under license by [facility] [employer] (which disclaims liability or warranty for this information).  This care instruction is for use with your licensed healthcare professional. If you have questions about a medical condition or this instruction, always ask your healthcare professional. [address] any warranty or liability for your use of this information. Content Version: 9.3.07187; Last Revised: September 6, 2011  Breast Engorgement: After Your Visit  Your Care Instructions  Breast engorgement is the painful overfilling of the breasts that can occur during breast-feeding. It usually occurs when your breasts make more milk than your baby can drink, when you are unable to breast-feed or pump, and when you stop breast-feeding your baby. Breast engorgement can make it hard for your baby to latch on to your nipple. Your baby may then be unable to breast-feed. This makes engorgement worse. If you are breast-feeding, engorgement should get better in 12 to 24 hours and should disappear within a few days. If you are not breast-feeding, you will get better in 1 to 5 days or when your body stops making breast milk. Follow-up care is a key part of your treatment and safety. Be sure to make and go to all appointments, and call your doctor if you are having problems. Its also a good idea to know your test results and keep a list of the medicines you take. How can you care for yourself at home? · If your doctor gave you medicine, take it exactly as prescribed. Call your doctor if you think you are having a problem with your medicine. · Take an over-the-counter pain medicine, such as acetaminophen (Tylenol), ibuprofen (Advil, Motrin), or naproxen (Aleve). Read and follow all instructions on the label. · Do not take two or more pain medicines at the same time unless the doctor told you to. Many pain medicines have acetaminophen, which is Tylenol. Too much acetaminophen (Tylenol) can be harmful. · If your baby is having a hard time latching on, let out (express) a small amount of milk with your hands or a pump.  This will help soften your nipple and make it easier for your baby to latch on.  · If your breasts are uncomfortably full, pump or express breast milk by hand just until they are comfortable. Do not empty your breasts all the way. Releasing a lot of milk will cause your body to produce larger amounts of milk. This can make breast engorgement worse. · Gently massage your breasts to help milk flow during breast-feeding or pumping. · Apply a frozen wet towel, cold gel or ice packs, or bags of frozen vegetables to your breasts for 15 minutes at a time every hour as needed. (Put a thin cloth between the ice pack and your skin.)  · Avoid tight bras that press on your breasts. A tight bra can cause blocked milk ducts. To prevent breast engorgement  · Put a warm, wet washcloth on your breasts before breast-feeding. This may help your breasts \"let down,\" increasing the flow of milk. Or you can take a warm shower or use a heating pad set on low. (Never use a heating pad in bed, because you may fall asleep and burn yourself.)  · Change your baby's position occasionally to make sure that all parts of your breasts are emptied. · Make sure your baby is latched on properly. · Talk to your doctor or a lactation consultant about any problems you have with breast-feeding. When should you call for help? Watch closely for changes in your health, and be sure to contact your doctor if:  · You have increased redness or swelling in your breasts. · You have a fever. · Your pain gets worse. · You are not better in 2 or 3 days. Where can you learn more? Go to EventCombo.be  Enter Z048 in the search box to learn more about \"Breast Engorgement: After Your Visit. \"   © 7088-8626 Healthwise, Incorporated. Care instructions adapted under license by [facility] (which disclaims liability or warranty for this information).  This care instruction is for use with your licensed healthcare professional. If you have questions about a medical condition or this instruction, always ask your healthcare professional. [address] any warranty or liability for your use of this information. Content Version: 9.3.49329; Last Revised: June 21, 2011         Breast-Feeding: After Your Visit  Your Care Instructions    Breast-feeding has many benefits. It may lower your baby's chances of getting an infection. It also may prevent your baby from having problems such as diabetes and high cholesterol later in life. Breast-feeding also helps you bond with your baby. The American Academy of Pediatrics recommends breast-feeding for at least a year. That may be very hard for many women to do, but breast-feeding even for a shorter period of time is a health benefit to you and your baby. In the first days after birth, your breasts make a thick, yellow liquid called colostrum. This liquid gives your baby nutrients and antibodies against infection. It is all that babies need in the first days after birth. Your breasts will fill with milk a few days after the birth. Breast-feeding is a skill that gets better with practice. It is normal to have some problems. Some women have sore or cracked nipples, blocked milk ducts, or a breast infection (mastitis). But if you feed your baby every 1 to 2 hours during the day and use good breast-feeding methods, you may not have these problems. You can treat these problems if they happen and continue breast-feeding. Follow-up care is a key part of your treatment and safety. Be sure to make and go to all appointments, and call your doctor if you are having problems. Its also a good idea to know your test results and keep a list of the medicines you take. How can you care for yourself at home? · Breast-feed your baby whenever he or she is hungry. In the first 2 weeks, your baby will feed about every 1 to 3 hours. This will help you keep up your supply of milk.   · Put a bed pillow or a nursing pillow on your lap to support your arms and your baby. · Hold your baby in a comfortable position. ¨ You can hold your baby in several ways. One of the most common positions is the cradle hold. One arm supports your baby, with his or her head in the bend of your elbow. Your open hand supports your baby's bottom or back. Your baby's belly lies against yours. ¨ If you had your baby by , or , try the football hold. This position keeps your baby off your belly. Tuck your baby under your arm, with his or her body along the side you will be feeding on. Support your baby's upper body with your arm. With that hand you can control your baby's head to bring his or her mouth to your breast.  ¨ Try different positions with each feeding. If you are having problems, ask for help from your doctor or a lactation consultant. · To get your baby to latch on:  ¨ Support and narrow your breast with one hand using a \"U hold,\" with your thumb on the outer side of your breast and your fingers on the inner side. You can also use a \"C hold,\" with all your fingers below the nipple and your thumb above it. Try the different holds to get the deepest latch for whichever breast-feeding position you use. Your other arm is behind your baby's back, with your hand supporting the base of the baby's head. Position your fingers and thumb to point toward your baby's ears. ¨ You can touch your baby's lower lip with your nipple to get your baby to open his or her mouth. Wait until your baby opens up really wide, like a big yawn. Then be sure to bring the baby quickly to your breast--not your breast to the baby. As you bring your baby toward your breast, use your other hand to support the breast and guide it into his or her mouth. ¨ Both the nipple and a large portion of the darker area around the nipple (areola) should be in the baby's mouth. The baby's lips should be flared outward, not folded in (inverted).   ¨ Listen for a regular sucking and swallowing pattern while the baby is feeding. If you cannot see or hear a swallowing pattern, watch the baby's ears, which will wiggle slightly when the baby swallows. If the baby's nose appears to be blocked by your breast, tilt the baby's head back slightly, so just the edge of one nostril is clear for breathing. ¨ When your baby is latched, you can usually remove your hand from supporting your breast and bring it under your baby to cradle him or her. Now just relax and breast-feed your baby. · You will know that your baby is feeding well when:  ¨ His or her mouth covers a lot of the areola, and the lips are flared out. ¨ His or her chin and nose rest against your breast.  ¨ Sucking is deep and rhythmic, with short pauses. ¨ You are able to see and hear your baby swallowing. ¨ You do not feel pain in your nipple. · If your baby takes only one breast at a feeding, start the next feeding on the other breast.  · Anytime you need to remove your baby from the breast, put one finger in the corner of his or her mouth. Push your finger between your baby's gums to gently break the seal. If you do not break the tight seal before you remove your baby, your nipples can become sore, cracked, or bruised. · After feeding your baby, gently pat his or her back to let out any swallowed air. After your baby burps, offer the breast again, or offer the other breast. Sometimes a baby will want to keep feeding after being burped. When should you call for help? Call your doctor now or seek immediate medical care if:  · You have problems with breast-feeding, such as:  ¨ Sore, red nipples. ¨ Stabbing or burning breast pain. ¨ A hard lump in your breast.  ¨ A fever, chills, or flu-like symptoms. Watch closely for changes in your health, and be sure to contact your doctor if:  · Your baby has trouble latching on to your breast.  · You continue to have pain or discomfort when breast-feeding.   · Your baby wets fewer than 4 diapers a day.  · You have other questions or concerns. Where can you learn more? Go to [employer]  Enter P492 in the search box to learn more about \"Breast-Feeding: After Your Visit. \"   © 5518-7691 [employer]. Care instructions adapted under license by [facility] (which disclaims liability or warranty for this information). This care instruction is for use with your licensed healthcare professional. If you have questions about a medical condition or this instruction, always ask your healthcare professional. [place] [address] any warranty or liability for your use of this information.   Content Version: 9.3.77640; Last Revised: February 10, 2012

## 2021-03-24 NOTE — PROGRESS NOTES
6751- TOB, live male infant    80- placenta out, magnesium discontinued    0930- Patient resting in bed with family at bedside     1450- Called the lab to inform of patient labs needed to be drawn    26- Spoke with Dr. Jaciel Sparks made aware of labs, states patient can be discharged when ready    [phone] 74- Discharge instructions given to patient, patient verbalized understanding, patient discharged to home in stable condition

## 2021-03-24 NOTE — DISCHARGE SUMMARY
Obstetrical Discharge Summary     Name: Amy Koch MRN: [id number]  SSN: [id number]    YOB: 1995  Age: 22 y.o. Sex: female      Admit Date: 3/24/2021    Discharge Date: 3/24/2021    Admitting Physician: Williams Gonzalez MD     Attending Physician:  Tanisha Kapadia MD     Discharge Diagnoses:   S/p  male infant for  labor at 32 weeks,  premature rupture of membranes. Information for the patient's :  Aletha Garcia [[id number]]   Delivery of a 2 lb 3.1 oz (0.995 kg) male infant via Vaginal, Spontaneous on 3/24/2021 at 8:59 AM  by Anshu Randolph. Apgars were 4  and 8 . Additional Diagnoses:   Problem List as of 3/24/2021 Date Reviewed: 3/24/2021          Codes Class Noted - Resolved    Spontaneous vaginal delivery ICD-10-CM: O80  ICD-9-CM: 650  3/3/2020 - Present        * (Principal) RESOLVED:  labor in second trimester ICD-10-CM: O60.02  ICD-9-CM: 644.20  3/24/2021 - 3/24/2021        RESOLVED:  labor ICD-10-CM: O60.00  ICD-9-CM: 644.00  3/24/2021 - 3/24/2021        RESOLVED:  premature rupture of membranes (PPROM) delivered, current hospitalization ICD-10-CM: O42.919  ICD-9-CM: 658.11  3/24/2021 - 3/24/2021        RESOLVED: UTI in pregnancy, second trimester ICD-10-CM: O23.42  ICD-9-CM: 646.63, 599.0  2021 - 3/24/2021    Overview Signed 2021  9:56 PM by Sunitha Fournier NP     TWIN in 4 weeks             RESOLVED: Short interval between pregnancies affecting pregnancy in second trimester, antepartum ICD-10-CM: O09.892  ICD-9-CM: V23.89  2021 - 3/24/2021        RESOLVED: Supervision of high risk pregnancy, antepartum, second trimester ICD-10-CM: O09.92  ICD-9-CM: V23.9  2021 - 3/24/2021        RESOLVED: Obesity affecting pregnancy in second trimester ICD-10-CM: O99.212  ICD-9-CM: 649.13  2021 - 3/24/2021    Overview Addendum 2/10/2021  3:55 PM by Sunitha Fournier NP     21 EFW 78%, male fetus.   Early glucola is normal; repeat 1 hr GTT @ 28 weeks              RESOLVED: Late prenatal care in second trimester ICD-10-CM: O09.32  ICD-9-CM: V23.7  1/12/2021 - 3/24/2021        RESOLVED: 40 weeks gestation of pregnancy ICD-10-CM: Z3A.40  ICD-9-CM: V22.2  3/1/2020 - 3/3/2020        RESOLVED: Uterine contractions during pregnancy ICD-10-CM: O62.2  ICD-9-CM: 661.20  3/1/2020 - 3/3/2020        RESOLVED: IUP (intrauterine pregnancy), incidental ICD-10-CM: Z33.1  ICD-9-CM: V22.2  2/26/2020 - 3/24/2021        RESOLVED: 39 weeks gestation of pregnancy ICD-10-CM: Z3A.39  ICD-9-CM: V22.2  2/26/2020 - 3/1/2020        RESOLVED: Abdominal cramping affecting pregnancy ICD-10-CM: O26.899, R10.9  ICD-9-CM: 646.80, 789.00  2/26/2020 - 3/1/2020        RESOLVED: Irregular contractions ICD-10-CM: O62.2  ICD-9-CM: 661.20  2/26/2020 - 3/1/2020        RESOLVED: GBS (group B Streptococcus carrier), +RV culture, currently pregnant ICD-10-CM: O99.820  ICD-9-CM: V23.89, V02.51  2/9/2020 - 3/3/2020        RESOLVED: Anemia in pregnancy, third trimester ICD-10-CM: O99.013  ICD-9-CM: 648.23  12/9/2019 - 3/3/2020    Overview Signed 12/9/2019  3:06 PM by Adi Knox NP     10.8 on 12/5/19 - started on ferrous sulfate once daily             RESOLVED: Urinary tract infection in mother during second trimester of pregnancy ICD-10-CM: O23.42  ICD-9-CM: 646.63, 599.0  10/21/2019 - 3/1/2020    Overview Addendum 1/8/2020  8:10 PM by Adi Knox NP     TWIN @ Nov OB visit. - neg in Dec 2019  Abnormal urine culture 1/6/20 - TWIN at next OB visit.               RESOLVED: High-risk pregnancy in third trimester ICD-10-CM: O09.93  ICD-9-CM: V23.9  10/15/2019 - 3/3/2020        RESOLVED: Obesity affecting pregnancy in third trimester ICD-10-CM: O99.213  ICD-9-CM: 649.13  9/27/2019 - 3/3/2020    Overview Addendum 10/15/2019  9:59 AM by James Cowden, MD     Early 1 hr GTT normal.  NST's biweekly at 37 wks  Female, EFW 42% at morph                   Lab Results Component Value Date/Time    Rubella, External Immune 09/25/2019    GrBStrep, External positive 02/04/2020     Recent Labs     03/24/21  1505   [facility] course following the delivery. Disposition: home    Discharge Condition: Good    Patient Instructions:   Current Discharge Medication List      START taking these medications    Details   ibuprofen (MOTRIN) 800 mg tablet Take 1 Tab by mouth every eight (8) hours as needed for Pain. Indications: pain  Qty: 30 Tab, Refills: 1         CONTINUE these medications which have NOT CHANGED    Details   ondansetron (ZOFRAN ODT) 4 mg disintegrating tablet Take 1 Tab by mouth every eight (8) hours as needed for Nausea or Vomiting. Qty: 30 Tab, Refills: 0    Associated Diagnoses: Nausea and vomiting in pregnancy      ferrous sulfate (IRON) 325 mg (65 mg iron) EC tablet Take 1 Tab by mouth every other day. Qty: 90 Tab, Refills: 0    Associated Diagnoses: Anemia in pregnancy, third trimester      prenatal vit-calcium-iron-fa (PRENATAL PLUS, CALCIUM CARB,) 27 mg iron- 1 mg tab Take 1 Tab by mouth daily. Reference my discharge instructions.     Follow-up Appointments   Procedures    FOLLOW UP VISIT Appointment in: 6 Weeks     Standing Status:   Standing     Number of Occurrences:   1     Order Specific Question:   Appointment in     Answer:   6 Weeks        Signed By:  Katiana Byrnes MD     March 24, 2021                       BST

## 2021-03-24 NOTE — ANESTHESIA PREPROCEDURE EVALUATION
Relevant Problems   No relevant active problems       Anesthetic History   No history of anesthetic complications            Review of Systems / Medical History  Patient summary reviewed and pertinent labs reviewed    Pulmonary              Pertinent negatives: No asthma  Comments: Denies asthma   Neuro/Psych         Psychiatric history     Cardiovascular                  Exercise tolerance: >4 METS     GI/Hepatic/Renal  Within defined limits              Endo/Other          Pertinent negatives: No morbid obesity   Other Findings              Physical Exam    Airway  Mallampati: II  TM Distance: > 6 cm  Neck ROM: normal range of motion   Mouth opening: Normal     Cardiovascular    Rhythm: regular  Rate: normal         Dental         Pulmonary  Breath sounds clear to auscultation               Abdominal  GI exam deferred       Other Findings            Anesthetic Plan    ASA: 2, emergent  Anesthesia type: epidural            Anesthetic plan and risks discussed with: Patient

## 2021-03-24 NOTE — H&P
History & Physical    Name: Tiffanie Hanks MRN: [id number]  SSN: [id number]    YOB: 1995  Age: 22 y.o. Sex: female      Subjective:     Reason for Admission:  Pregnancy and Contractions    History of Present Illness: Ms. Mg Lucero is a 22 y. o.  female with an estimated gestational age of 34w7d with Estimated Date of Delivery: 21. Patient complains of severe contractions and moderate vaginal leaking of fluid for 1 day. Pregnancy has been complicated by late prenatal care, short interval between pregnancies. Patient denies fever, headache , nausea and vomiting and vaginal bleeding . She states that she felt some mild contractions yesterday during the day but approximately at 0100 today the contractions became stronger and more painful, more often. She felt a gush of fluid at 0500 this morning. OB History    Para Term  AB Living   2 1 1     1   SAB TAB Ectopic Molar Multiple Live Births           0 1      # Outcome Date GA Lbr Mark/2nd Weight Sex Delivery Anes PTL Lv   2 Current            1 Term 20 40w3d / 00:17 6 lb 14.4 oz (3.13 kg) F Vag-Spont EPI N ROQUE      Birth Comments: [place]     Past Medical History:   Diagnosis Date    Anemia     Asthma     last attack years ago    Recurrent UTI      No past surgical history on file.   Social History     Occupational History    Not on file   Tobacco Use    Smoking status: Never Smoker    Smokeless tobacco: Never Used   Substance and Sexual Activity    Alcohol use: Not Currently    Drug use: Not Currently    Sexual activity: Yes     Partners: Male      Family History   Problem Relation Age of Onset    Stroke Maternal Grandfather     Heart Disease Maternal Grandfather     Kidney Disease Maternal Grandfather     Cancer Sister         retinal cancer (at 7mos)    Heart Disease Sister         different heart problems       No Known Allergies  Prior to Admission medications    Medication Sig Start Date End Date Taking? Authorizing Provider   ondansetron (ZOFRAN ODT) 4 mg disintegrating tablet Take 1 Tab by mouth every eight (8) hours as needed for Nausea or Vomiting. 21   Arturo Lloyd NP   ferrous sulfate (IRON) 325 mg (65 mg iron) EC tablet Take 1 Tab by mouth every other day. 3/3/20   Fidelina Geiger MD   prenatal vit-calcium-iron-fa (PRENATAL PLUS, CALCIUM CARB,) 27 mg iron- 1 mg tab Take 1 Tab by mouth daily. Provider, Historical        Review of Systems:  Pertinent items are noted in the History of Present Illness. Objective:     Vitals:    Vitals:    21 0658 21 0700 21 0703 21 0720   BP:  97/75     Pulse:  (!) 117     Resp:       Temp:       SpO2: 100%  100%    Weight:    223 lb (101.2 kg)   Height:    5' 6\" (1.676 m)      Temp (24hrs), Av.9 °F (37.2 °C), Min:98.9 °F (37.2 °C), Max:98.9 °F (37.2 °C)    BP  Min: 97/75  Max: 114/55     Physical Exam:  Patient appears uncomfortable with contractions  Abdomen: gravid  Cervical Exam: 5 cm dilated per RN  Membranes:  Amnisure negative on admission but patient just had some leakage of fluid and amnisure positive. Uterine Activity:  Frequency: Every 1-2  minutes   Fetal Heart Rate:  Baseline: 140's per minute,     Lab/Data Review:  Recent Results (from the past 24 hour(s))   RUPTURE OF FETAL MEMBRANES, POC    Collection Time: 21  6:30 AM   Result Value Ref Range    Rupture of fetal membrane Negative Negative    Control line present?  Acceptable     Internal negative control Acceptable     Kit Lot No. [id number]     Expiration date 23    CBC WITH AUTOMATED DIFF    Collection Time: 21  6:40 AM   Result Value Ref Range    WBC 10.2 4.6 - 13.2 K/uL    RBC 3.95 (L) 4.20 - 5.30 M/uL    HGB 11.8 (L) 12.0 - 16.0 g/dL    HCT 36.6 35.0 - 45.0 %    MCV 92.7 74.0 - 97.0 FL    MCH 29.9 24.0 - 34.0 PG    MCHC 32.2 31.0 - 37.0 g/dL    RDW 13.5 11.6 - 14.5 %    PLATELET 976 (L) 705 - 420 K/uL    MPV 12.3 (H) 9.2 - 11.8 FL    NEUTROPHILS 76 (H) 40 - 73 %    LYMPHOCYTES 13 (L) 21 - 52 %    MONOCYTES 10 3 - 10 %    EOSINOPHILS 1 0 - 5 %    BASOPHILS 0 0 - 2 %    ABS. NEUTROPHILS 7.7 1.8 - 8.0 K/UL    ABS. LYMPHOCYTES 1.3 0.9 - 3.6 K/UL    ABS. MONOCYTES 1.0 0.05 - 1.2 K/UL    ABS. EOSINOPHILS 0.1 0.0 - 0.4 K/UL    ABS. BASOPHILS 0.0 0.0 - 0.1 K/UL    DF AUTOMATED         Assessment and Plan:     Principal Problem:     labor in second trimester (3/24/2021)    Active Problems:    IUP (intrauterine pregnancy), incidental (2020)      Short interval between pregnancies affecting pregnancy in second trimester, antepartum (2021)      Late prenatal care in second trimester (2021)       -  Labor:  48 hour course of steroids to promote fetal lung maturity. Start Intravenous Penicillin if not Allergic for Group B Streptococcal prophylaxis  Start Magnesium sulfate for tocolysis and neuro-protection. Neonatology consult   -  Premature Rupture of the Membranes. Suspect that patient will deliver today given advanced dilation.